# Patient Record
Sex: MALE | Race: WHITE
[De-identification: names, ages, dates, MRNs, and addresses within clinical notes are randomized per-mention and may not be internally consistent; named-entity substitution may affect disease eponyms.]

---

## 2017-05-01 ENCOUNTER — HOSPITAL ENCOUNTER (INPATIENT)
Dept: HOSPITAL 74 - FER | Age: 82
LOS: 4 days | Discharge: HOME | DRG: 312 | End: 2017-05-05
Attending: NURSE PRACTITIONER | Admitting: INTERNAL MEDICINE
Payer: COMMERCIAL

## 2017-05-01 VITALS — BODY MASS INDEX: 30.9 KG/M2

## 2017-05-01 DIAGNOSIS — Z87.891: ICD-10-CM

## 2017-05-01 DIAGNOSIS — D72.818: ICD-10-CM

## 2017-05-01 DIAGNOSIS — B34.9: ICD-10-CM

## 2017-05-01 DIAGNOSIS — I10: ICD-10-CM
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
## 2017-05-01 DIAGNOSIS — Z87.442: ICD-10-CM
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
## 2017-05-01 DIAGNOSIS — I95.1: Primary | ICD-10-CM

## 2017-05-01 DIAGNOSIS — D69.6: ICD-10-CM

## 2017-05-01 DIAGNOSIS — E86.1: ICD-10-CM

## 2017-05-01 DIAGNOSIS — M10.9: ICD-10-CM

## 2017-05-01 DIAGNOSIS — S09.90XA: ICD-10-CM

## 2017-05-01 DIAGNOSIS — D72.820: ICD-10-CM

## 2017-05-01 DIAGNOSIS — Z95.0: ICD-10-CM

## 2017-05-01 LAB
ALBUMIN SERPL-MCNC: 4.6 G/DL (ref 3.5–5)
ALP SERPL-CCNC: 63 U/L (ref 32–92)
ALT SERPL-CCNC: 28 U/L (ref 10–40)
ANION GAP SERPL CALC-SCNC: 12 MMOL/L (ref 8–16)
AST SERPL-CCNC: 36 U/L (ref 10–42)
BASOPHILS # BLD: 3.2 % (ref 0–2)
BILIRUB SERPL-MCNC: 1.1 MG/DL (ref 0.2–1)
CALCIUM SERPL-MCNC: 9.2 MG/DL (ref 8.4–10.2)
CK SERPL-CCNC: 104 IU/L (ref 38–174)
CO2 SERPL-SCNC: 25 MMOL/L (ref 22–28)
CREAT SERPL-MCNC: 1.1 MG/DL (ref 0.6–1.3)
DEPRECATED RDW RBC AUTO: 13.7 % (ref 11.9–15.9)
EOSINOPHIL # BLD: 0 % (ref 0–4.5)
GLUCOSE SERPL-MCNC: 115 MG/DL (ref 74–106)
INR BLD: 1.14 (ref 0.82–1.09)
MAGNESIUM SERPL-MCNC: 1.8 MG/DL (ref 1.8–2.4)
MCH RBC QN AUTO: 31 PG (ref 25.7–33.7)
MCHC RBC AUTO-ENTMCNC: 34.5 G/DL (ref 32–35.9)
MCV RBC: 90 FL (ref 80–96)
NEUTROPHILS # BLD: 71.4 % (ref 42.8–82.8)
PLATELET # BLD AUTO: 124 K/MM3 (ref 134–434)
PMV BLD: 9 FL (ref 7.5–11.1)
PROT SERPL-MCNC: 8.3 G/DL (ref 6.4–8.3)
PT PNL PPP: 12.7 SEC (ref 10.2–13)
TROPONIN I SERPL-MCNC: 0.03 NG/ML (ref 0.03–0.5)
WBC # BLD AUTO: 4.8 K/MM3 (ref 4–10.8)

## 2017-05-01 NOTE — PDOC
History of Present Illness





- General


Chief Complaint: Injury


Stated Complaint: DIZZY AND FELL


Time Seen by Provider: 05/01/17 21:46


History Source: Patient


Exam Limitations: No Limitations





- History of Present Illness


Initial Comments: 


05/01/17 22:23


81y M hx of HTN, s/p PM, presents with feeling lightheaded all day and this 

evening, he stood up, felt dizzy and fell, hitting his head on a table, there 

was no LOC but he felt generaly weak and was unable to get up on his own. He 

had to call his family, who called the ambulance. The pt denies any headache, n/

v, vision changes, neck pain, numbness/tingling weakness. The pt did endorse 

feeling chills/warm today, feeling dizzy/lightheaded since he awoke today, and 

endorse some frequency for the past week. Pt denies any heaadche, chest pain, 

sob, cough, abd pain, n/v, back pain, n/v, diarrhea, melena, bpr.  




















Past History





- Past Medical History


Allergies/Adverse Reactions: 


 Allergies











Allergy/AdvReac Type Severity Reaction Status Date / Time


 


No Known Allergies Allergy   Verified 05/01/17 23:17











Home Medications: 


Ambulatory Orders





Losartan Potassium [Cozaar] 100 mg PO DAILY 05/01/17 








Anemia: No


Asthma: No


Cancer: No


Cardiac Disorders: Yes (PACEMAKER 2011)


CVA: No


COPD: No


CHF: No


Dementia: No


Diabetes: No


GI Disorders: No


 Disorders: No


HTN: Yes


Hypercholesterolemia: No


Liver Disease: No


Seizures: No


Thyroid Disease: No





- Surgical History


Abdominal Surgery: No


Appendectomy: Yes (1944)


Cardiac Surgery: Yes (PACEMAKER 2011)


Cholecystectomy: Yes (1990S)


Lung Surgery: No


Neurologic Surgery: No


Orthopedic Surgery: Yes (TKR LEFT KNEE 2006/TKR RIGHT 2011)





- Psycho/Social/Smoking Cessation Hx


Anxiety: No


Suicidal Ideation: No


Smoking History: Former smoker


Have you smoked in the past 12 months: No


Number of Cigarettes Smoked Daily: 0


If you are a former smoker, when did you quit?: 1950S


'Breaking Loose' booklet given: 02/07/16


Hx Alcohol Use: Yes (OCCAS.)


Drug/Substance Use Hx: No


Substance Use Type: Alcohol


Hx Substance Use Treatment: No





**Review of Systems





- Review of Systems


Able to Perform ROS?: Yes


Comments:: 





05/01/17 22:39


Constitutional - +Chills, weakness, no reported Fever, 


HEENT: no reported vision changes, sore throat


Respiratory: no reported cough, sob, hemoptysis


Cardiac: +light headedness, no reported chest pain, palpitations,  leg swelling


Abd/GI: no reported abd pain, nausea, vomiting, blood per rectum, melena, 

diarrhea


: +frequency, no reported dysuria, discharge


Musculskelatal - no reported back pain, joint swelling


skin - no reported bruising, erythema, rash


neurological: no reported headache, numbness, focal weakness, tingling, ataxia, 

weakness


hematologic: no reported anemia, easy bruising, easy bleeding








*Physical Exam





- Physical Exam


Comments: 





05/01/17 22:40


GENERAL: The patient is awake, alert, and fully oriented, Nontoxic - in no 

acute distress.


HEAD: Normocephalic, contusion over R brow w/o focal tendenress, stepoffs.


EYES: extraocular movements intact, sclera anicteric, conjunctiva clear.


ENT: Normal voice,  Moist mucous membranes.


NECK: Normal range of motion, supple


LUNGS: Breath sounds equal, clear to auscultation bilaterally.  No wheezes, no 

rhonchi, no rales.


HEART: Regular rate and rhythm, normal S1 and S2 without murmur, rub or gallop.


ABDOMEN: Soft, nontender, normoactive bowel sounds.  No guarding, no rebound.  

. No CVA tenderness


EXTREMITIES: Normal range of motion, no edema.  No clubbing or cyanosis. No 

cords, erythema, or tenderness.


NEUROLOGICAL: No facial assymetry, Normal speech, 


PSYCH: Normal mood, normal affect.


SKIN: Warm, Dry, normal turgor,


Back: No midline tenderness to the cervical, thoracic or lumbar spine


Musculoskelatal: FROM of b/l shoulders, elbows, wrist. FROM of hips, knees, 

ankles - No signs of ecchymosis, erythema, or crepitus noted on palpation 

extremities, chest wall, clavicals, ribs, back.  





**Heart Score/ECG Review





- ECG Impressions


Comment:: 





05/02/17 01:44


Twelve-lead EKG was performed and reviewed by me. 


EKG performed:


Paced ventricular rhythm


Rate of 92


Appropriate discordance


No signs of ischemia via Sgarbossa criteria





ED Treatment Course





- LABORATORY


CBC & Chemistry Diagram: 


 05/01/17 22:50





 05/01/17 22:50





Medical Decision Making





- Medical Decision Making


05/01/17 22:41


pt noted febrile here


sepsis initiated


?uti as pt has had frequency


will r/o anemia, metabolic dernagement, occult infection


ct head to r/o bleed


ekg to screen for arrythmia


will give fluids, tylenol


will reassess











05/02/17 01:32


labs reviewed


no source of fever


ua clean


cxr does not reavdeal any signs of pna


influenza sent


will admit pt for further evaluation of fever and presyncope


will hold empiric abx as there is no source








05/02/17 01:42








05/02/17 01:43


case dw dr. lemus 


will admit to observation in tele for further management





Case discussed in detail with admitting physician including history, physical 

exam and ancillary studies.


Admitting physician has assumed care for the patient, will follow all pending 

diagnostics and will complete the evaluation and treatment. 








*DC/Admit/Observation/Transfer


Diagnosis at time of Disposition: 


 Pre-syncope





Fever


Qualifiers:


 Fever type: unspecified Qualified Code(s): R50.9 - Fever, unspecified





Head injury


Qualifiers:


 Encounter type: initial encounter Qualified Code(s): S09.90XA - Unspecified 

injury of head, initial encounter





- Discharge Dispostion


Condition at time of disposition: Guarded


Admit: Yes





- Referrals


Referrals: 


Carlo Hui MD [Primary Care Provider] -

## 2017-05-02 LAB
ALBUMIN SERPL-MCNC: 3.9 G/DL (ref 3.5–5)
ALP SERPL-CCNC: 55 U/L (ref 32–92)
ALT SERPL-CCNC: 32 U/L (ref 10–40)
ANION GAP SERPL CALC-SCNC: 10 MMOL/L (ref 8–16)
APPEARANCE UR: CLEAR
AST SERPL-CCNC: 44 U/L (ref 10–42)
BASOPHILS # BLD: 0.7 % (ref 0–2)
BILIRUB SERPL-MCNC: 1.4 MG/DL (ref 0.2–1)
BILIRUB UR STRIP.AUTO-MCNC: NEGATIVE MG/DL
CALCIUM SERPL-MCNC: 8.5 MG/DL (ref 8.4–10.2)
CK MB: 2.7 NG/ML (ref 0.3–4)
CK SERPL-CCNC: 234 IU/L (ref 38–174)
CO2 SERPL-SCNC: 24 MMOL/L (ref 22–28)
COLOR UR: YELLOW
CREAT SERPL-MCNC: 1.2 MG/DL (ref 0.6–1.3)
DEPRECATED RDW RBC AUTO: 13.6 % (ref 11.9–15.9)
EOSINOPHIL # BLD: 0.1 % (ref 0–4.5)
GLUCOSE SERPL-MCNC: 126 MG/DL (ref 74–106)
HYALINE CASTS URNS QL MICRO: 1 /LPF
KETONES UR QL STRIP: NEGATIVE
LEUKOCYTE ESTERASE UR QL STRIP.AUTO: NEGATIVE
MAGNESIUM SERPL-MCNC: 1.9 MG/DL (ref 1.8–2.4)
MCH RBC QN AUTO: 31.4 PG (ref 25.7–33.7)
MCHC RBC AUTO-ENTMCNC: 34.4 G/DL (ref 32–35.9)
MCV RBC: 91.2 FL (ref 80–96)
MUCOUS THREADS URNS QL MICRO: (no result)
NEUTROPHILS # BLD: 76.5 % (ref 42.8–82.8)
NITRITE UR QL STRIP: NEGATIVE
PH UR: 6 [PH] (ref 5–8)
PHOSPHATE SERPL-MCNC: 4.4 MG/DL (ref 2.5–4.6)
PLATELET # BLD AUTO: 112 K/MM3 (ref 134–434)
PMV BLD: 9.2 FL (ref 7.5–11.1)
PROT SERPL-MCNC: 6.9 G/DL (ref 6.4–8.3)
PROT UR QL STRIP: (no result)
PROT UR QL STRIP: NEGATIVE
RBC # BLD AUTO: 3 /HPF (ref 0–3)
RBC # UR STRIP: (no result) /UL
SP GR UR: 1.01 (ref 1–1.03)
TROPONIN I SERPL-MCNC: < 0.03 NG/ML (ref 0.03–0.5)
UROBILINOGEN UR STRIP-MCNC: (no result) E.U./DL (ref 0.2–1)
WBC # BLD AUTO: 5 K/MM3 (ref 4–10.8)
WBC # UR AUTO: 1 /HPF (ref 3–5)

## 2017-05-02 RX ADMIN — HEPARIN SODIUM SCH UNIT: 5000 INJECTION, SOLUTION INTRAVENOUS; SUBCUTANEOUS at 21:17

## 2017-05-02 RX ADMIN — ACETAMINOPHEN PRN MG: 325 TABLET ORAL at 12:30

## 2017-05-02 RX ADMIN — CEFTRIAXONE SODIUM SCH MLS/HR: 2 INJECTION, POWDER, FOR SOLUTION INTRAMUSCULAR; INTRAVENOUS at 12:30

## 2017-05-02 RX ADMIN — ACETAMINOPHEN PRN MG: 325 TABLET ORAL at 23:12

## 2017-05-02 RX ADMIN — LOSARTAN POTASSIUM SCH MG: 50 TABLET, FILM COATED ORAL at 10:00

## 2017-05-02 RX ADMIN — RANITIDINE SCH MG: 150 TABLET ORAL at 13:27

## 2017-05-02 NOTE — HP
CHIEF COMPLAINT:  pre-syncope/fever 





PCP:  Dr Hui


Cardiologist: Dr Wheeler 





HISTORY OF PRESENT ILLNESS:  patient is a 82 y/o male with a past medical 

history of hypertension, PPM, renal calculi (left urethral stent placement, 

removed 3/16), and gout.  patient's reports that yesterday, 2017, he 

stood up from a  sitting position and became dizzy and fell forward striking 

his head on a table. Patient denied any loss of consciousness and is able to 

recall a full incident in detail. in addition patient reports feeling 

increasinglytired with chills earlier in the day.  He does report on 2017

,  general malaise and chills.  as a result, patient contacted EMS and was 

transferred to the emergency department for further evaluation. 





ER course was notable for:


(1) ct of head no acute pathology


(2)tmax of 101.0 upon arrival to the ED 


(3)chest xray,  no infilitrates no effusion noted 





Recent Travel:  none





PAST MEDICAL HISTORY:  htn, renal calculi, gout


PAST SURGICAL HISTORY: ppm, urethral stent placement 





Social History:  retired lives at home, with wife 


Smoking: none 


Alcohol:none 


Drugs: none 





Family History:  father,  of MI at 80 mother,  of CHF 65 sister  of 

brain tumor at 71


Allergies





No Known Allergies Allergy (Verified 17 23:17)


 








HOME MEDICATIONS:


 Home Medications











 Medication  Instructions  Recorded


 


Losartan Potassium [Cozaar] 100 mg PO DAILY 17








REVIEW OF SYSTEMS


CONSTITUTIONAL: 


Present: fever, chills, diaphoresis, generalized weakness, malaise,


Absent:   loss of appetite, weight change


HEENT: 


Absent:  rhinorrhea, nasal congestion, throat pain, throat swelling, difficulty 

swallowing, mouth swelling, ear pain, eye pain, visual changes


CARDIOVASCULAR: 


Absent: chest pain, syncope, palpitations, irregular heart rate, lightheadedness

, peripheral edema


RESPIRATORY: 


Absent: cough, shortness of breath, dyspnea with exertion, orthopnea, wheezing, 

stridor, hemoptysis


GASTROINTESTINAL:


Absent: abdominal pain, abdominal distension, nausea, vomiting, diarrhea, 

constipation, melena, hematochezia


GENITOURINARY: 


Absent: dysuria, frequency, urgency, hesitancy, hematuria, flank pain, genital 

pain


MUSCULOSKELETAL: 


Absent: myalgia, arthralgia, joint swelling, back pain, neck pain


SKIN: 


Absent: rash, itching, pallor


HEMATOLOGIC/IMMUNOLOGIC: 


Absent: easy bleeding, easy bruising, lymphadenopathy, frequent infections


ENDOCRINE:


Absent: unexplained weight gain, unexplained weight loss, heat intolerance, 

cold intolerance


NEUROLOGIC: 


Absent: headache, focal weakness or paresthesias, dizziness, unsteady gait, 

seizure, mental status changes, bladder or bowel incontinence


PSYCHIATRIC: 


Absent: anxiety, depression, suicidal or homicidal ideation, hallucinations.








PHYSICAL EXAMINATION


 Vital Signs - 24 hr











  17





  02:16 06:09 07:32


 


Temperature 98.3 F 97.9 F 


 


Pulse Rate 70 68 


 


Respiratory 





Rate   


 


Blood Pressure 109/56 108/59 


 


O2 Sat by Pulse   96





Oximetry (%)   











GENERAL: diaphoretic, Awake, alert, and fully oriented, in no acute distress.


HEAD: Normal with no signs of trauma.


EYES: Pupils equal, round and reactive to light, extraocular movements intact, 

sclera anicteric, conjunctiva clear. No lid lag.


EARS, NOSE, THROAT: Ears normal, nares patent, oropharynx clear without 

exudates. Moist mucous membranes.


NECK: Normal range of motion, supple without lymphadenopathy, JVD, or masses.


LUNGS: Breath sounds equal, clear to auscultation bilaterally. No wheezes, and 

no crackles. No accessory muscle use.


HEART: Regular rate and rhythm, normal S1 and S2, 2/6 sysolic murmur, rub or 

gallop.


ABDOMEN: Soft, nontender, not distended, normoactive bowel sounds, no guarding, 

no rebound, no masses.  No hepatomegaly or  splenomegaly. 


MUSCULOSKELETAL: Normal range of motion at all joints. No bony deformities or 

tenderness. No CVA tenderness.


UPPER EXTREMITIES: 2+ pulses, warm, well-perfused. No cyanosis. No clubbing. No 

peripheral edema.


LOWER EXTREMITIES: 2+ pulses, warm, well-perfused. No calf tenderness. No 

peripheral edema. 


NEUROLOGICAL:  Cranial nerves II-XII intact. Normal speech. Normal gait.


PSYCHIATRIC: Cooperative. Good eye contact. Appropriate mood and affect.


SKIN: Warm, dry, normal turgor, no rashes or lesions noted, normal capillary 

refill. 





ASSESSMENT/PLAN:





1) card: 


presyncope:


- likely secondary from to static hypotension due to hypovolemia versus 

dysrhythmias high clinical concern for atrial fibrillation


- Continuous cardiac monitoring 


- EKG reviewed, a ventricular paced with PVCs


- troponin 1 WNL pending second and third troponin.


- medtronic contacted for interrogation of pace maker


- nuclear stress tests 2016, LVEF 44%, no chest discomfort or EKG changes

, small size mild to moderate apical infarct, remainder of LV normal myocardial 

perfusion as per cardiologist Dr. Rajput


- Echocardiogram, 2016 EF 68% mild aortic stenosis mild LVH


- appreciate cardiology input Dr. Wheeler patient's primary cardiologist


hypertension


- Continue losartan home dose, close monitoring of BP





2) ID


Fever of unknown origin


- high clinical concern for sepsis, patient has a past medical history of 

bacteremia 2016 from a  source


- CT of abdomen/pelvis ordered assess for hydronephrosis


- Pending blood cultures and urine cultures


- t max 101.0 no leukocytosis noted


- Patient input from ID (Lenin) 





3) ms: 


gout


- no acute exacerbation at this time





F/E/N


- Gentle IVF


- Regular diet





PPX


Zantac


Heparin


PT


OOB


SCD





dispo: requires inpatient telemetry 





Visit type





- Emergency Visit


Emergency Visit: Yes


ED Registration Date: 17


Care time: The patient presented to the Emergency Department on the above date 

and was hospitalized for further evaluation of their emergent condition.





- New Patient


This patient is new to me today: Yes


Date on this admission: 17





- Critical Care


Critical Care patient: Yes


Total Critical Care Time (in minutes): 45


Critical Care Statement: The care of this patient involved high complexity 

decision making to prevent further life threatening deterioration of the patient

's condition and/or to evalute & treat vital organ system(s) failure or risk of 

failure.

## 2017-05-02 NOTE — PN
Progress Note (short form)





- Note


Progress Note: 


ID Consult dictated


80 y/o male admitted with dizziness, fall


+ high grade fever, chills





Suspect sepsis, possible  source


Possible viral syndrome


Thrombocytopenia, possibly secondary to sepsis





Await c/s 


Empiric ceftriaxone + stat dose vancomycin

## 2017-05-02 NOTE — EKG
Test Reason : 

Blood Pressure : ***/*** mmHG

Vent. Rate : 092 BPM     Atrial Rate : 048 BPM

   P-R Int : 000 ms          QRS Dur : 168 ms

    QT Int : 476 ms       P-R-T Axes : 000 -76 076 degrees

   QTc Int : 588 ms

 

Ventricular-paced rhythm WITH FREQUENT PREMATURE VENTRICULAR COMPLEXES

ABNORMAL ECG

NO PREVIOUS ECGS AVAILABLE

Confirmed by EVAN SIGALA MD ([id number]) on 5/2/2017 11:01:16 PM

 

Referred By: MD ALLEN           Confirmed By:EVAN SIGALA MD

## 2017-05-02 NOTE — CONS
DATE OF CONSULTATION:  2017

 

REQUESTING PHYSICIAN:  Daniel Veloz M.D. 

 

HISTORY OF PRESENT ILLNESS:  The patient is an 81-year-old man admitted on May 1,

2017, because of lightheadedness, falling, and head trauma.  

 

The patient has no history of a previous myocardial infarction, angina, or congestive

heart failure; however, in 2009, he had implantation of a Medtronic DDD

pacemaker for complete heart block.  The most recent noninvasive studies include a

2016 echocardiogram which demonstrated mild aortic stenosis.  The aortic

valve area was 1.8 sq cm, and a peak gradient was 6 mmHg.  There was mild mitral

insufficiency, and the pulmonary artery systolic pressure was 30 to 35 mmHg.  The

left ventricular ejection fraction was 68%.  A Lexiscan sestamibi study demonstrated

a left ventricular ejection fraction of 44% without any evidence of myocardial

ischemia.  He is admitted now after complaining of a shaking chills the day prior to

admission, and then on the day of admission he felt lightheaded, he got up, felt

dizzy, he did pass out, but fell down and hit his head, was unable to get up.  He was

brought to the emergency room where he was found to have a fever to 103 and admitted

for further evaluation and treatment.  He presently denies any symptoms of chest

pain, palpitations, shortness of breath, nausea, or vomiting.  

 

There is a previous history of hypertension.  

 

MEDICATION:  Prior to admission include allopurinol 300 mg per day, amlodipine 10 mg

per day, aspirin 81 mg per day, and losartan 100 mg per day.  

 

PRIOR MEDICAL HISTORY:  Hypertension, osteoarthritis, gout, complete heart block

requiring a permanent pacemaker 2009, renal stone, hyperlipidemia, intolerant

of medical intervention.

 

PRIOR SURGICAL HISTORY:  Total knee replacement, cardiac pacemaker, cholecystectomy,

appendectomy.  

 

FAMILY HISTORY:  His mother  of congestive heart failure at age 65, his father

 of a myocardial infarction at age 80.  A sister  of a brain tumor.

 

SOCIAL HISTORY:  He lives at home, manages his own affairs.  There is no history of

alcohol abuse.  He smoked in the distant past, stopping in 1959.  He is a business

owner.  

 

REVIEW OF SYSTEMS:  

General:  No weight loss.  

Gastrointestinal:  No melena, no vomiting.  

Pulmonary:  No hemoptysis.  

Cardiac:  No edema.  

Neurological:  No focal deficit.  

 

PHYSICAL EXAMINATION:

General:  The patient appears in no acute distress, lying flat.  

Vital signs:  The temperature on admission was 103.7, presently afebrile.  The blood

pressure is 110/60, the heart rate is 90, the respiratory rate is 18 per minute, the

weight is 186 pounds.  

Neck:  No neck vein distention.  The permanent pacemaker is palpable in the right

subclavicular area.  

Lungs:  Lung fields are clear.

Heart:  The heart sounds are normal.  There is a 1-2 over 6 systolic ejection murmur

at the base best heard sitting upright with breath holding.  The murmur is early

peaking.  No diastolic sounds are appreciated.  

Abdomen:  Soft and nontender.  No organ enlargement.  No peripheral edema.  

Neurologic:  There is no focal neurological deficit.

 

DATABASE:    The electrocardiogram demonstrates a paced rhythm.  The pacemaker is

sensing and capturing appropriately in a VVIR mode.  Premature ventricular

depolarizations are noted.  Compared to a previous tracing, dated 2016,

DDDR pacing is not apparent, i.e. P waves are not apparent on the present tracing.

 

The chest x-ray was a portable film, technically limited.  The heart size cannot be

accurately assessed.  There is no distinct infiltrate.  

 

LABORATORY:  Studies of note include a white blood cell count of 5.0, hematocrit 49%,

platelet count of 112,000.  The INR is 1.1.  The serum sodium is 132, BUN 15,

creatinine 1.1.  The troponin level is 0.03.  The urinalysis is negative for white

blood cells, +1 protein, negative glucose.  

 

IMPRESSION:  The working diagnosis is orthostatic hypertension exacerbated by

intravascular volume depletion related to vasodilatation secondary to

fever/infection.  There is no evidence to suggest acute ischemic event/infarction. 

Pacemaker function appears to be normal.  However, concerning this for the underlying

rhythm as P waves are not apparent on the present tracing, raising concern for

underlying atrial fibrillation which would be a new rhythm disturbance for the

patient.

 

RECOMMENDATION:  I recommended the followin.  Antibiotics dictated by cultures.

2.  Interrogation of the Medtr[employer] pacemaker with particular concern regarding atrial

activity, sensing, and ? atrial fibrillation.  

3.  Hold antihypertensive agents at this time.

4.  Further cardiac testing is not required at this time.  Further workup and

treatment will be dictated by the results of the above evaluation and the patient's

clinical course.  

 

Thank you for allowing me to take part in the care of this presentation.  Will follow

along with you.  

 

 

BUCK LOVETT M.D.

 

[id number]

DD: 2017 10:11

DT: 2017 20:05

Job #:  [id number]

 

cc:  Carlo Hui M.D. 

cc:  Daniel Veloz M.D. 

cc:  Silvana Bailey N.P.

## 2017-05-02 NOTE — CONS
DATE OF CONSULTATION:  

 

DATE OF DICTATION: 05/02/2017

 

HISTORY OF PRESENT ILLNESS:  An 81-year-old male evaluated for possible sepsis.  The

patient was admitted to the hospital on May 1, 2017.  He felt dizzy at home and had

fallen, sustaining trauma to his right face.  He was seen in the emergency room where

CAT scan of the head was done and was negative for acute infarct or bleed.  He was

seen in consultation by Cardiology.  His hospital course was notable for a

temperature to 103.7.  Patient complained of chills and also complained of some

recent urinary frequency.  He denied any dysuria or hematuria.   No complaints of

chest pain, shortness of breath, cough, or sputum production.  No vomiting or

diarrhea.  A rapid flu test was performed and was negative.  

 

PAST MEDICAL HISTORY: Positive for nephrolithiasis, hypertension, osteoarthritis,

gouty arthritis, hyperlipidemia. 

 

PAST SURGICAL HISTORY:  Status post permanent pacemaker, bilateral total knee

replacements, cholecystectomy, appendectomy. 

 

ALLERGIES:  No known allergies. 

 

MEDICATIONS:  Cozaar, Norvasc, allopurinol, aspirin.  

 

SOCIAL HISTORY:  He is a former smoker.  He works in a [employer] business. 

He denies any recent hospitalizations.  No known ill contacts.  No recent travel.  

 

SYSTEMS REVIEW:

Neurologic:  No loss of consciousness, seizure activity, focal weakness, cardiac,

negative chest pain or palpitations.  

Respiratory:  Negative cough or sputum production.  

Gastrointestinal:   Negative vomiting or diarrhea. 

Genitourinary:   As per HPI. 

 

LABORATORY DATA:  White count 5.0, hematocrit 48.9, platelet count 112, BUN 22,

creatinine 1.2.  Urinalysis:  1 white cell.  Influenza swab negative.  Urine culture

and blood culture is pending.  Chest x-ray negative for acute infiltrate.  

 

PHYSICAL EXAMINATION:

General:  He is awake and alert.  He is in no acute distress.  He is noted to have

some chills.  Temperature 97.9, T-max 103.7, blood pressure 108/59, pulse 68 and

regular, respirations 20 per minute. 

HEENT:  Sclerae anicteric.  

Heart: Sounds S1, S2.  

Lungs:  Scattered rhonchi bilaterally.  No rales or wheezing.  

Abdomen:  Obese, soft, nontender.  

Extremities:  1+ edema.  There is an abrasion present on the right maxillary area

secondary to his fall.  

 

IMPRESSION:   An 81-year-old male admitted with dizziness and fall, now with high

grade fever or chills. 

 

Suspect sepsis possibly secondary to genitourinary source, cannot rule out viral

syndrome.  Await blood and urine culture results, empiric antibiotic coverage with

ceftriaxone IV piggyback daily and stat dose of vancomycin 1 g.  Await culture

results, will follow.  

 

Thank you for the kind referral. 

 

 

EMILY JIMÉNEZ M.D.

 

[id number]

DD: 05/02/2017 11:51

DT: 05/02/2017 19:44

Job #:  [id number]

## 2017-05-02 NOTE — CONSULT
Consult - text type





- Consultation


Consultation Note: 


ASKED BY DR. ELLIOTT TO SEE PT.


PT SEEN, EXAMINED. ECG, PRIOR RECORDS REVIEWED.


82 YO MAN 5/01/17 DIZZINESS/FALLING.


WORKING DX:


ORTHOSTATIC HYPOTENSION EXACERBATED BY INTRAVASCULAR VOLUME DEPLETION,


  VASODILATATION DUE TO FEVER/INFECTION.


NO EVIDENCE OF ACUTE MYOCARDIAL ISCHEMIA/INFARCTION.


NORMAL PACEMAKER FUNCTION





ECG VVI PACED RHYTHM.


      ?UNDERLING ATRIAL FIBRILLATION.





REC:


ANTIBIOTICS DICTATED BY CULTURES.


INTERROGATE PACEMAKER WITH PARTICULAR CONCERN REGARDING ATRIAL SENSING/ATRIAL 

FIBRILLATION.


HOLD ANTI  HYPERTENSIVE AGENTS.


NO FURTHER CARDIAC TESTING AT THIS TIME.





THANKS.


FULL NOTE DICTATED.


WILL FOLLOW.

## 2017-05-03 LAB
ALBUMIN SERPL-MCNC: 3.5 G/DL (ref 3.5–5)
ALP SERPL-CCNC: 48 U/L (ref 32–92)
ALT SERPL-CCNC: 30 U/L (ref 10–40)
ANION GAP SERPL CALC-SCNC: 9 MMOL/L (ref 8–16)
AST SERPL-CCNC: 50 U/L (ref 10–42)
BASOPHILS # BLD: 1.1 % (ref 0–2)
BILIRUB SERPL-MCNC: 1.1 MG/DL (ref 0.2–1)
CALCIUM SERPL-MCNC: 8.6 MG/DL (ref 8.4–10.2)
CO2 SERPL-SCNC: 20 MMOL/L (ref 22–28)
CREAT SERPL-MCNC: 1 MG/DL (ref 0.6–1.3)
DEPRECATED RDW RBC AUTO: 13.5 % (ref 11.9–15.9)
EOSINOPHIL # BLD: 0.1 % (ref 0–4.5)
GLUCOSE SERPL-MCNC: 115 MG/DL (ref 74–106)
MAGNESIUM SERPL-MCNC: 2 MG/DL (ref 1.8–2.4)
MCH RBC QN AUTO: 30.4 PG (ref 25.7–33.7)
MCHC RBC AUTO-ENTMCNC: 33.5 G/DL (ref 32–35.9)
MCV RBC: 90.7 FL (ref 80–96)
NEUTROPHILS # BLD: 55.4 % (ref 42.8–82.8)
PHOSPHATE SERPL-MCNC: 2.8 MG/DL (ref 2.5–4.6)
PLATELET # BLD AUTO: 89 K/MM3 (ref 134–434)
PMV BLD: 10.1 FL (ref 7.5–11.1)
PROT SERPL-MCNC: 6.7 G/DL (ref 6.4–8.3)
WBC # BLD AUTO: 3.3 K/MM3 (ref 4–10.8)

## 2017-05-03 RX ADMIN — LOSARTAN POTASSIUM SCH MG: 50 TABLET, FILM COATED ORAL at 09:15

## 2017-05-03 RX ADMIN — PIPERACILLIN SODIUM,TAZOBACTAM SODIUM SCH: 3; .375 INJECTION, POWDER, FOR SOLUTION INTRAVENOUS at 19:41

## 2017-05-03 RX ADMIN — RANITIDINE SCH MG: 150 TABLET ORAL at 09:15

## 2017-05-03 RX ADMIN — CEFTRIAXONE SODIUM SCH MLS/HR: 2 INJECTION, POWDER, FOR SOLUTION INTRAMUSCULAR; INTRAVENOUS at 09:15

## 2017-05-03 RX ADMIN — HEPARIN SODIUM SCH UNIT: 5000 INJECTION, SOLUTION INTRAVENOUS; SUBCUTANEOUS at 09:16

## 2017-05-03 RX ADMIN — HEPARIN SODIUM SCH UNIT: 5000 INJECTION, SOLUTION INTRAVENOUS; SUBCUTANEOUS at 22:36

## 2017-05-03 NOTE — PN
Physical Exam: 


SUBJECTIVE: Patient seen and examined, patient reports feeling slightly improved

, reports ongoing cough, spiked fever tmax 103.4, last evening. 





OBJECTIVE:patient is a 82 y/o male with a past medical history of hypertension, 

PPM, renal calculi (left urethral stent placement, removed 3/16), and gout  he 

was admitted from the emergency department for presyncopal episode and sepsis





 Vital Signs











 Period  Temp  Pulse  Resp  BP Sys/Rivera  Pulse Ox


 


 Last 24 Hr  97.9 F-103.4 F  49-96  16-19  100-133/54-68  91-97














GENERAL: diaphoretic, Awake, alert, and fully oriented, in no acute distress.


HEAD: Normal with no signs of trauma.


EYES: Pupils equal, round and reactive to light, extraocular movements intact, 

sclera anicteric, conjunctiva clear. No lid lag.


EARS, NOSE, THROAT: Ears normal, nares patent, oropharynx clear without 

exudates. Moist mucous membranes.


NECK: Normal range of motion, supple without lymphadenopathy, JVD, or masses.


LUNGS: Breath sounds equal, clear to auscultation bilaterally. No wheezes, and 

no crackles. No accessory muscle use.


HEART: Regular rate and rhythm, normal S1 and S2, 2/6 sysolic murmur, rub or 

gallop.


ABDOMEN: Soft, nontender, not distended, normoactive bowel sounds, no guarding, 

no rebound, no masses.  No hepatomegaly or  splenomegaly. 


MUSCULOSKELETAL: Normal range of motion at all joints. No bony deformities or 

tenderness. No CVA tenderness.


UPPER EXTREMITIES: 2+ pulses, warm, well-perfused. No cyanosis. No clubbing. No 

peripheral edema.


LOWER EXTREMITIES: 2+ pulses, warm, well-perfused. No calf tenderness. No 

peripheral edema. 


NEUROLOGICAL:  Cranial nerves II-XII intact. Normal speech. Normal gait.


PSYCHIATRIC: Cooperative. Good eye contact. Appropriate mood and affect.


SKIN: Warm, dry, normal turgor, no rashes or lesions noted, normal capillary 

refill. 





 Laboratory Results - last 24 hr











  05/03/17 05/03/17





  10:12 10:12


 


WBC  3.3 L D 


 


RBC  4.89 


 


Hgb  14.8  D 


 


Hct  44.3 


 


MCV  90.7 


 


MCHC  33.5 


 


RDW  13.5 


 


Plt Count  89 L D 


 


MPV  10.1 


 


Neutrophils %  55.4  D 


 


Lymphocytes %  34.5  D 


 


Monocytes %  8.9 


 


Eosinophils %  0.1 


 


Basophils %  1.1 


 


Sodium   131 L


 


Potassium   3.9


 


Chloride   102


 


Carbon Dioxide   20 L


 


Anion Gap   9


 


BUN   19 H


 


Creatinine   1.0


 


Creat Clearance w eGFR   > 60


 


Random Glucose   115 H


 


Calcium   8.6


 


Phosphorus   2.8  D


 


Magnesium   2.0


 


Total Bilirubin   1.1 H D


 


AST   50 H


 


ALT   30


 


Alkaline Phosphatase   48


 


Total Protein   6.7


 


Albumin   3.5








Active Medications











Generic Name Dose Route Start Last Admin





  Trade Name Freq  PRN Reason Stop Dose Admin


 


Acetaminophen  650 mg 05/02/17 11:33 05/02/17 23:12





  Tylenol -  PO   650 mg





  Q4H PRN   Administration





  FEVER OR PAIN   


 


Heparin Sodium (Porcine)  5,000 unit 05/02/17 22:00 05/03/17 09:16





  Heparin -  SQ   5,000 unit





  BID KATERINE   Administration


 


Piperacillin Sod/Tazobactam Sod  50 mls @ 100 mls/hr 05/03/17 10:15  





  Zosyn 3.375gm Ivpb (Pre-Docked)  IVPB   





  Q8H-IV KATERINE   





  Protocol   


 


Losartan Potassium  100 mg 05/02/17 10:00 05/03/17 09:15





  Cozaar -  PO   100 mg





  DAILY [facility]   Administration


 


Ranitidine HCl  150 mg 05/02/17 12:00 05/03/17 09:15





  Zantac -  PO   150 mg





  DAILY KATERINE   Administration








 Microbiology





05/01/17 23:10   Urine - Urine Clean Catch   Urine Culture - Final


                            NO GROWTH OBTAINED


05/02/17 01:10   Nasopharyngeal Swab   Influenza Types A,B Antigen (DYANA) - Final


05/02/17 01:10   Nasopharyngeal Swab    - Final, negative





IMAGING


nuclear stress tests 11/08/2016, LVEF 44%, no chest discomfort or EKG changes, 

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 

perfusion as per cardiologist Dr. Rajput


Echocardiogram, 11/01/2016 EF 68% mild aortic stenosis mild LVH


CT of abdomen and pelvis:no hydronephrosis noted


CT of chest: no acute pathology 


EKG a ventricular paced with PVCs





ASSESSMENT/PLAN:





1) card: 


presyncope:


- likely secondary from to static hypotension due to hypovolemia 


- Continuous cardiac monitoring 


- troponin 3 WNL


- pacemaker interrogated, no atrial fibrillation noted


- Cardiology consulted and following 


hypertension


- Continue losartan home dose, close monitoring of BP





2) ID


sepsis


- unknown origin, pending blood culture


- t max 103.3 no leukocytosis noted


- abx changed to zosyn 


- ID consulted and following  (Lenin) 





3) ms: 


gout


- no acute exacerbation at this time





F/E/N


- Regular diet


- replete lytes prn 





PPX


Zantac


Heparin


PT


OOB


SCD





dispo: requires inpatient telemetry 








Visit type





- Emergency Visit


Emergency Visit: Yes


ED Registration Date: 05/02/17


Care time: The patient presented to the Emergency Department on the above date 

and was hospitalized for further evaluation of their emergent condition.





- New Patient


This patient is new to me today: No





- Critical Care


Critical Care patient: No





- Discharge Referral


Referred to [facility] Yes


Physician Referral: Carlo Hui MD (Int Med)

## 2017-05-03 NOTE — PN
Progress Note, Physician


History of Present Illness: 


Persistant fever noted- Tmax 103.4


No focal complaint


Denies chest pain/ dyspnea/ cough


No c/o dysuria/ hematuria


No c/o rigors


WBC WNL


Thrombocytopenia


Cultures





- Current Medication List


Current Medications: 


Active Medications





Acetaminophen (Tylenol -)  650 mg PO Q4H PRN


   PRN Reason: FEVER OR PAIN


   Last Admin: 05/02/17 23:12 Dose:  650 mg


Heparin Sodium (Porcine) (Heparin -)  5,000 unit SQ BID [facility]


   Last Admin: 05/03/17 09:16 Dose:  5,000 unit


Sodium Chloride (Normal Saline -)  1,000 mls @ 75 mls/hr IV ASDIR [facility]


   Last Admin: 05/02/17 13:28 Dose:  75 mls/hr


Ceftriaxone Sodium (Rocephin 2gm Ivpb (Pre-Docked))  100 mls @ 200 mls/hr IVPB 

DAILY [facility]


   Last Admin: 05/03/17 09:15 Dose:  200 mls/hr


Losartan Potassium (Cozaar -)  100 mg PO DAILY [facility]


   Last Admin: 05/03/17 09:15 Dose:  100 mg


Ranitidine HCl (Zantac -)  150 mg PO DAILY [facility]


   Last Admin: 05/03/17 09:15 Dose:  150 mg











- Objective


Vital Signs: 


 Vital Signs











Temperature  99.0 F   05/03/17 04:00


 


Pulse Rate  96 H  05/03/17 04:00


 


Respiratory Rate  18   05/03/17 07:24


 


Blood Pressure  120/64   05/03/17 04:00


 


O2 Sat by Pulse Oximetry (%)  91 L  05/03/17 07:24











Constitutional: Yes: No Distress


Eyes: Yes: Conjunctiva Clear


Cardiovascular: Yes: Regular Rate and Rhythm, S1, S2


Respiratory: Yes: CTA Bilaterally


Gastrointestinal: Yes: Normal Bowel Sounds, Soft.  No: Tenderness


Genitourinary: No: CVA Tenderness - Left, CVA Tenderness - Right


Labs: 


 CBC, BMP





 05/02/17 08:20 





 05/02/17 09:11 





 INR, PTT











INR  1.14  (0.82-1.09)   05/01/17  22:50    














Assessment/Plan


Fever   likely  v. pulmonary source


Possible viral syndrome


S/P fall





Cultures pending


Obtain CT chest R/O infiltrate


Broaden antimicrobial coverage

## 2017-05-04 LAB
ALBUMIN SERPL-MCNC: 3.4 G/DL (ref 3.5–5)
ALP SERPL-CCNC: 44 U/L (ref 32–92)
ALT SERPL-CCNC: 26 U/L (ref 10–40)
ANION GAP SERPL CALC-SCNC: 8 MMOL/L (ref 8–16)
AST SERPL-CCNC: 38 U/L (ref 10–42)
BILIRUB SERPL-MCNC: 0.6 MG/DL (ref 0.2–1)
CALCIUM SERPL-MCNC: 8.5 MG/DL (ref 8.4–10.2)
CO2 SERPL-SCNC: 23 MMOL/L (ref 22–28)
CREAT SERPL-MCNC: 1 MG/DL (ref 0.6–1.3)
DEPRECATED RDW RBC AUTO: 13.6 % (ref 11.9–15.9)
EOSINOPHIL # BLD: 1 % (ref 0–4.5)
GLUCOSE SERPL-MCNC: 103 MG/DL (ref 74–106)
MAGNESIUM SERPL-MCNC: 2 MG/DL (ref 1.8–2.4)
MCH RBC QN AUTO: 30.5 PG (ref 25.7–33.7)
MCHC RBC AUTO-ENTMCNC: 33.6 G/DL (ref 32–35.9)
MCV RBC: 90.9 FL (ref 80–96)
NEUTROPHILS # BLD: 41 % (ref 42.8–82.8)
PLATELET # BLD AUTO: 90 K/MM3 (ref 134–434)
PMV BLD: 9.8 FL (ref 7.5–11.1)
PROT SERPL-MCNC: 6.2 G/DL (ref 6.4–8.3)
WBC # BLD AUTO: 2.8 K/MM3 (ref 4–10.8)

## 2017-05-04 RX ADMIN — PIPERACILLIN SODIUM,TAZOBACTAM SODIUM SCH MLS/HR: 3; .375 INJECTION, POWDER, FOR SOLUTION INTRAVENOUS at 09:02

## 2017-05-04 RX ADMIN — VANCOMYCIN HYDROCHLORIDE SCH: 1 INJECTION, POWDER, LYOPHILIZED, FOR SOLUTION INTRAVENOUS at 19:20

## 2017-05-04 RX ADMIN — PIPERACILLIN SODIUM,TAZOBACTAM SODIUM SCH: 3; .375 INJECTION, POWDER, FOR SOLUTION INTRAVENOUS at 19:20

## 2017-05-04 RX ADMIN — HEPARIN SODIUM SCH UNIT: 5000 INJECTION, SOLUTION INTRAVENOUS; SUBCUTANEOUS at 09:03

## 2017-05-04 RX ADMIN — VANCOMYCIN HYDROCHLORIDE SCH MLS/HR: 1 INJECTION, POWDER, LYOPHILIZED, FOR SOLUTION INTRAVENOUS at 21:39

## 2017-05-04 RX ADMIN — LOSARTAN POTASSIUM SCH MG: 50 TABLET, FILM COATED ORAL at 09:02

## 2017-05-04 RX ADMIN — HEPARIN SODIUM SCH UNIT: 5000 INJECTION, SOLUTION INTRAVENOUS; SUBCUTANEOUS at 21:39

## 2017-05-04 RX ADMIN — RANITIDINE SCH MG: 150 TABLET ORAL at 09:02

## 2017-05-04 RX ADMIN — PIPERACILLIN SODIUM,TAZOBACTAM SODIUM SCH MLS/HR: 3; .375 INJECTION, POWDER, FOR SOLUTION INTRAVENOUS at 01:22

## 2017-05-04 NOTE — PN
[id number]. 





OBJECTIVE: patient is a 82 y/o male with a past medical history of hypertension

, PPM, renal calculi (left urethral stent placement, removed 3/16), and gout  

he was admitted from the emergency department for presyncopal episode and sepsis








 Vital Signs











 Period  Temp  Pulse  Resp  BP Sys/Rivera  Pulse Ox


 


 Last 24 Hr  98.2 F-98.6 F  66-79  18-19  119-130/46-73  90-98








physical examination


GENERAL: diaphoretic, Awake, alert, and fully oriented, in no acute distress.


HEAD: Normal with no signs of trauma.


EYES: Pupils equal, round and reactive to light, extraocular movements intact, 

sclera anicteric, conjunctiva clear. No lid lag.


EARS, NOSE, THROAT: Ears normal, nares patent, oropharynx clear without 

exudates. Moist mucous membranes.


NECK: Normal range of motion, supple without lymphadenopathy, JVD, or masses.


LUNGS: Breath sounds equal, clear to auscultation bilaterally. No wheezes, and 

no crackles. No accessory muscle use.


HEART: Regular rate and rhythm, normal S1 and S2, 2/6 sysolic murmur, rub or 

gallop.


ABDOMEN: Soft, nontender, not distended, normoactive bowel sounds, no guarding, 

no rebound, no masses.  No hepatomegaly or  splenomegaly. 


MUSCULOSKELETAL: Normal range of motion at all joints. No bony deformities or 

tenderness. No CVA tenderness.


UPPER EXTREMITIES: 2+ pulses, warm, well-perfused. No cyanosis. No clubbing. No 

peripheral edema.


LOWER EXTREMITIES: 2+ pulses, warm, well-perfused. No calf tenderness. No 

peripheral edema. 


NEUROLOGICAL:  Cranial nerves II-XII intact. Normal speech. Normal gait.


PSYCHIATRIC: Cooperative. Good eye contact. Appropriate mood and affect.


SKIN: Warm, dry, normal turgor, no rashes or lesions noted, normal capillary 

refill. 





 Laboratory Results - last 24 hr











  05/04/17 05/04/17





  07:00 07:00


 


WBC  2.8 L 


 


RBC  4.58 


 


Hgb  14.0 


 


Hct  41.6 


 


MCV  90.9 


 


MCHC  33.6 


 


RDW  13.6 


 


Plt Count  90 L 


 


MPV  9.8 


 


Neutrophils %  Y 


 


Lymphocytes %  Y 


 


Sodium   135 L


 


Potassium   4.0


 


Chloride   104


 


Carbon Dioxide   23


 


Anion Gap   8


 


BUN   17


 


Creatinine   1.0


 


Creat Clearance w eGFR   > 60


 


Random Glucose   103


 


Calcium   8.5


 


Magnesium   2.0


 


Total Bilirubin   0.6  D


 


AST   38  D


 


ALT   26


 


Alkaline Phosphatase   44


 


Total Protein   6.2 L


 


Albumin   3.4 L








Active Medications











Generic Name Dose Route Start Last Admin





  Trade Name Bensonq  PRN Reason Stop Dose Admin


 


Acetaminophen  650 mg 05/02/17 11:33 05/02/17 23:12





  Tylenol -  PO   650 mg





  Q4H PRN   Administration





  FEVER OR PAIN   


 


Acetaminophen  1,000 mg 05/03/17 15:13  





  Ofirmev Injection -  IVPB   





  Q6H PRN   


 


Heparin Sodium (Porcine)  5,000 unit 05/02/17 22:00 05/04/17 09:03





  Heparin -  SQ   5,000 unit





  BID KATERINE   Administration


 


Piperacillin Sod/Tazobactam Sod  50 mls @ 100 mls/hr 05/03/17 10:15 05/04/17 09:

02





  Zosyn 3.375gm Ivpb (Pre-Docked)  IVPB   100 mls/hr





  Q8H-IV KATERINE   Administration





  Protocol   


 


Vancomycin HCl  250 mls @ 200 mls/hr 05/04/17 10:15  





  Vancomycin (Pre-Docked)  IVPB   





  BID KATERINE   


 


Losartan Potassium  100 mg 05/02/17 10:00 05/04/17 09:02





  Cozaar -  PO   100 mg





  DAILY [facility]   Administration


 


Ranitidine HCl  150 mg 05/02/17 12:00 05/04/17 09:02





  Zantac -  PO   150 mg





  DAILY KATER[facility]   Administration











 Microbiology





05/02/17 05:59   Blood - Peripheral Venous   Blood Culture - Preliminary


                            Pending Organism


05/02/17 11:36   Blood - Peripheral Venous   Blood Culture - Preliminary


                            NO GROWTH OBTAINED AFTER 24 HOURS, INCUBATION TO 

CONTINUE


                            FOR 4 DAYS.


05/02/17 11:36   Blood - Peripheral Venous   Blood Culture - Preliminary


                            NO GROWTH OBTAINED AFTER 24 HOURS, INCUBATION TO 

CONTINUE


                            FOR 4 DAYS.


05/02/17 05:59   Blood - Peripheral Venous   Blood Culture - Preliminary


                            NO GROWTH OBTAINED AFTER 24 HOURS, INCUBATION TO 

CONTINUE


                            FOR 4 DAYS.


05/02/17 01:10   Nasopharyngeal Swab   Respiratory Virus Panel - Preliminary


05/01/17 23:10   Urine - Urine Clean Catch   Urine Culture - Final


                            NO GROWTH OBTAINED


05/02/17 01:10   Nasopharyngeal Swab   Influenza Types A,B Antigen (DYANA) - Final

, NEGATIVE 


05/02/17 01:10   Nasopharyngeal Swab    - Final








IMAGING


nuclear stress tests 11/08/2016, LVEF 44%, no chest discomfort or EKG changes, 

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 

perfusion as per cardiologist Dr. Rajput


Echocardiogram, 11/01/2016 EF 68% mild aortic stenosis mild LVH


CT of abdomen and pelvis:no hydronephrosis noted


CT of chest: no acute pathology 


EKG a ventricular paced with PVCs





ASSESSMENT/PLAN:





1) card: 


presyncope:


- likely secondary from to orthostatic hypotension due to hypovolemia 


- Continuous cardiac monitoring 


- troponin 3 WNL


- pacemaker interrogated, no atrial fibrillation noted


- Cardiology consulted and following 


hypertension


- Continue losartan home dose, close monitoring of BP





2) ID


sepsis


- unknown origin, one +  blood culture, preliminary questionable source, vanc 

bid ordered 


- afebrile, no leukocystosis noted


- continue zosyn (5/3- )


- ID consulted and following  (Lenin) 





3) ms: 


gout


- no acute exacerbation at this time





4) heme


thrombocytopenia


- plt 90, likely secondary to viral infectionm 





F/E/N


- Regular diet


- replete lytes prn 





PPX


Zantac


Heparin


PT


OOB


SCD





dispo: requires inpatient telemetry 





Visit type





- Emergency Visit


Emergency Visit: Yes


ED Registration Date: 05/02/17


Care time: The patient presented to the Emergency Department on the above date 

and was hospitalized for further evaluation of their emergent condition.





- New Patient


This patient is new to me today: No





- Critical Care


Critical Care patient: No





- Discharge Referral


Referred to [facility] No

## 2017-05-04 NOTE — PN
Progress Note, Physician


History of Present Illness: 


OOB in chiar


No complaints


No c/o fever/ chills


No cough/ sputum


No dysuria/ hematuria


Worsening leukopenia/ thrombocytopenia


BC + GPCCL x1





- Current Medication List


Current Medications: 


Active Medications





Acetaminophen (Tylenol -)  650 mg PO Q4H PRN


   PRN Reason: FEVER OR PAIN


   Last Admin: 05/02/17 23:12 Dose:  650 mg


Acetaminophen (Ofirmev Injection -)  1,000 mg IVPB Q6H PRN


Heparin Sodium (Porcine) (Heparin -)  5,000 unit SQ BID [facility]


   Last Admin: 05/04/17 09:03 Dose:  5,000 unit


Piperacillin Sod/Tazobactam Sod (Zosyn 3.375gm Ivpb (Pre-Docked))  50 mls @ 100 

mls/hr IVPB Q8H-IV [facility]


   PRN Reason: Protocol


   Last Admin: 05/04/17 09:02 Dose:  100 mls/hr


Losartan Potassium (Cozaar -)  100 mg PO DAILY [facility]


   Last Admin: 05/04/17 09:02 Dose:  100 mg


Ranitidine HCl (Zantac -)  150 mg PO DAILY [facility]


   Last Admin: 05/04/17 09:02 Dose:  150 mg











- Objective


Vital Signs: 


 Vital Signs











Temperature  98.6 F   05/04/17 06:00


 


Pulse Rate  66   05/04/17 06:00


 


Respiratory Rate  19   05/04/17 08:03


 


Blood Pressure  130/55   05/04/17 06:00


 


O2 Sat by Pulse Oximetry (%)  98   05/04/17 08:03











Constitutional: Yes: No Distress


Eyes: Yes: Conjunctiva Clear


Cardiovascular: Yes: Regular Rate and Rhythm, S1, S2


Respiratory: Yes: CTA Bilaterally


Gastrointestinal: Yes: Normal Bowel Sounds, Soft.  No: Tenderness


Edema: No


Labs: 


 CBC, BMP





 05/04/17 07:00 





 05/04/17 07:00 





 INR, PTT











INR  1.14  (0.82-1.09)   05/01/17  22:50    














Assessment/Plan


Fever    Possible viral syndrome


     CT C/A/P unremarkable


+ BC GPCCL ? significance


S/P fall





Pending blood c/s add empiric vancomycin


Continue Zosyn

## 2017-05-05 VITALS — TEMPERATURE: 97.7 F | SYSTOLIC BLOOD PRESSURE: 122 MMHG | DIASTOLIC BLOOD PRESSURE: 35 MMHG | HEART RATE: 55 BPM

## 2017-05-05 LAB
BASOPHILS # BLD: 0.6 % (ref 0–2)
DEPRECATED RDW RBC AUTO: 13.6 % (ref 11.9–15.9)
EOSINOPHIL # BLD: 1.4 % (ref 0–4.5)
MCH RBC QN AUTO: 30.4 PG (ref 25.7–33.7)
MCHC RBC AUTO-ENTMCNC: 33.3 G/DL (ref 32–35.9)
MCV RBC: 91.4 FL (ref 80–96)
NEUTROPHILS # BLD: 40.7 % (ref 42.8–82.8)
PLATELET # BLD AUTO: 100 K/MM3 (ref 134–434)
PMV BLD: 9.4 FL (ref 7.5–11.1)
WBC # BLD AUTO: 2.9 K/MM3 (ref 4–10.8)

## 2017-05-05 RX ADMIN — RANITIDINE SCH MG: 150 TABLET ORAL at 09:13

## 2017-05-05 RX ADMIN — HEPARIN SODIUM SCH UNIT: 5000 INJECTION, SOLUTION INTRAVENOUS; SUBCUTANEOUS at 09:13

## 2017-05-05 RX ADMIN — LOSARTAN POTASSIUM SCH MG: 50 TABLET, FILM COATED ORAL at 09:51

## 2017-05-05 RX ADMIN — PIPERACILLIN SODIUM,TAZOBACTAM SODIUM SCH MLS/HR: 3; .375 INJECTION, POWDER, FOR SOLUTION INTRAVENOUS at 09:13

## 2017-05-05 RX ADMIN — PIPERACILLIN SODIUM,TAZOBACTAM SODIUM SCH MLS/HR: 3; .375 INJECTION, POWDER, FOR SOLUTION INTRAVENOUS at 01:21

## 2017-05-05 RX ADMIN — VANCOMYCIN HYDROCHLORIDE SCH MLS/HR: 1 INJECTION, POWDER, LYOPHILIZED, FOR SOLUTION INTRAVENOUS at 09:50

## 2017-05-05 NOTE — PN
Progress Note, Physician


History of Present Illness: 


Awake, alert


OOB in chair


No focal complaint


Temps down; afebrile


Leukopenic with lymphocytosis. Thrombocytopenic


Blood c/s staph sp- not yet identified





- Current Medication List


Current Medications: 


Active Medications





Acetaminophen (Tylenol -)  650 mg PO Q4H PRN


   PRN Reason: FEVER OR PAIN


   Last Admin: 05/02/17 23:12 Dose:  650 mg


Acetaminophen (Ofirmev Injection -)  1,000 mg IVPB Q6H PRN


Heparin Sodium (Porcine) (Heparin -)  5,000 unit SQ BID [facility]


   Last Admin: 05/05/17 09:13 Dose:  5,000 unit


Piperacillin Sod/Tazobactam Sod (Zosyn 3.375gm Ivpb (Pre-Docked))  50 mls @ 100 

mls/hr IVPB Q8H-IV KATERINE


   PRN Reason: Protocol


   Last Admin: 05/05/17 09:13 Dose:  100 mls/hr


Vancomycin HCl (Vancomycin (Pre-Docked))  250 mls @ 200 mls/hr IVPB BID [facility]


   Last Admin: 05/05/17 09:50 Dose:  200 mls/hr


Losartan Potassium (Cozaar -)  100 mg PO DAILY [facility]


   Last Admin: 05/05/17 09:51 Dose:  100 mg


Ranitidine HCl (Zantac -)  150 mg PO DAILY [facility]


   Last Admin: 05/05/17 09:13 Dose:  150 mg











- Objective


Vital Signs: 


 Vital Signs











Temperature  98.9 F   05/05/17 09:11


 


Pulse Rate  78   05/05/17 09:11


 


Respiratory Rate  17   05/05/17 09:11


 


Blood Pressure  124/51   05/05/17 09:11


 


O2 Sat by Pulse Oximetry (%)  96   05/05/17 06:00











Constitutional: Yes: No Distress


Eyes: Yes: Conjunctiva Clear


Cardiovascular: Yes: Regular Rate and Rhythm, S1, S2


Respiratory: Yes: CTA Bilaterally


Gastrointestinal: Yes: Normal Bowel Sounds, Soft.  No: Tenderness


Labs: 


 CBC, BMP





 05/04/17 07:00 





 05/04/17 07:00 





 INR, PTT











INR  1.14  (0.82-1.09)   05/01/17  22:50    














Assessment/Plan


Fever / leukopenia/ lymphocytosis/ thrombocytopenia -  Probable viral syndrome


     CT C/A/P unremarkable


+ BC GPCCL ? significance   suspect contamination


S/P fall





Repeat CBC


Await final blood c/s


If stable, D/C antibiotics and discharge home later today

## 2017-05-09 NOTE — DS
Physical Exam: 


SUBJECTIVE: Patient seen and examined, patient reports feeling well, sitting in 

bedside chair, denies any chest pain or shortness of breath.  





OBJECTIVE: patient is a 82 y/o male with a past medical history of hypertension

, PPM, renal calculi (left urethral stent placement, removed 3/16), and gout.  

patient's reports that yesterday, 05/01/2017, he stood up from a  sitting 

position and became dizzy and fell forward striking his head on a table. 

Patient denied any loss of consciousness and is able to recall a full incident 

in detail. in addition patient reports feeling increasingly tired with chills 

earlier in the day.  He does report on 04/30/2017,  general malaise and chills.

  as a result, patient contacted EMS and was transferred to the emergency 

department for further evaluation. 





ER course was notable for:


(1) ct of head no acute pathology


(2)tmax of 101.0 upon arrival to the ED 


(3)chest xray,  no infilitrates no effusion noted 





PHYSICAL EXAM





GENERAL: Awake, alert, and fully oriented, in no acute distress.


HEAD: Normal with no signs of trauma.


EYES: Pupils equal, round and reactive to light, extraocular movements intact, 

sclera anicteric, conjunctiva clear. No lid lag.


EARS, NOSE, THROAT: Ears normal, nares patent, oropharynx clear without 

exudates. Moist mucous membranes.


NECK: Normal range of motion, supple without lymphadenopathy, JVD, or masses.


LUNGS: Breath sounds equal, clear to auscultation bilaterally. No wheezes, and 

no crackles. No accessory muscle use.


HEART: Regular rate and rhythm, normal S1 and S2, 2/6 sysolic murmur, rub or 

gallop.


ABDOMEN: Soft, nontender, not distended, normoactive bowel sounds, no guarding, 

no rebound, no masses.  No hepatomegaly or  splenomegaly. 


MUSCULOSKELETAL: Normal range of motion at all joints. No bony deformities or 

tenderness. No CVA tenderness.


UPPER EXTREMITIES: 2+ pulses, warm, well-perfused. No cyanosis. No clubbing. No 

peripheral edema.


LOWER EXTREMITIES: 2+ pulses, warm, well-perfused. No calf tenderness. No 

peripheral edema. 


NEUROLOGICAL:  Cranial nerves II-XII intact. Normal speech. Normal gait.


PSYCHIATRIC: Cooperative. Good eye contact. Appropriate mood and affect.


SKIN: Warm, dry, normal turgor, no rashes or lesions noted, normal capillary 

refill. 





LABS





 CBC











WBC  2.9 K/mm3 (4.0-10.8)  L  05/05/17  10:00    


 


RBC  4.62 M/mm3 (4.00-5.60)   05/05/17  10:00    


 


Hgb  14.1 GM/dl (11.7-16.9)   05/05/17  10:00    


 


Hct  42.2 % (35.4-49)   05/05/17  10:00    


 


MCV  91.4 fl (80-96)   05/05/17  10:00    


 


MCHC  33.3 g/dl (32.0-35.9)   05/05/17  10:00    


 


RDW  13.6 % (11.9-15.9)   05/05/17  10:00    


 


Plt Count  100 K/MM3 (134-434)  L  05/05/17  10:00    


 


MPV  9.4 fl (7.5-11.1)   05/05/17  10:00    


 


Neutrophils %  40.7 % (42.8-82.8)  L  05/05/17  10:00    


 


Lymphocytes %  47.3 % (8-40)  H  05/05/17  10:00    


 


Monocytes %  10.0 % (3.8-10.2)   05/05/17  10:00    


 


Eosinophils %  1.4 % (0-4.5)   05/05/17  10:00    


 


Basophils %  0.6 % (0-2.0)   05/05/17  10:00    








 CMP











Sodium  135 mmol/L (136-145)  L  05/04/17  07:00    


 


Potassium  4.0 mmol/L (3.5-5.1)   05/04/17  07:00    


 


Chloride  104 mmol/L ()   05/04/17  07:00    


 


Carbon Dioxide  23 mmol/L (22-28)   05/04/17  07:00    


 


Anion Gap  8  (8-16)   05/04/17  07:00    


 


BUN  17 mg/dl (7-18)   05/04/17  07:00    


 


Creatinine  1.0 mg/dl (0.6-1.3)   05/04/17  07:00    


 


Creat Clearance w eGFR  > 60  (>60)   05/04/17  07:00    


 


Random Glucose  103 mg/dl ()   05/04/17  07:00    


 


Lactic Acid  1.452 mmol/L (0.4-2.0)   05/01/17  22:50    


 


Calcium  8.5 mg/dl (8.4-10.2)   05/04/17  07:00    


 


Phosphorus  2.8 mg/dl (2.5-4.6)  D 05/03/17  10:12    


 


Magnesium  2.0 mg/dL (1.8-2.4)   05/04/17  07:00    


 


Total Bilirubin  0.6 mg/dl (0.2-1.0)  D 05/04/17  07:00    


 


AST  38 U/L (10-42)  D 05/04/17  07:00    


 


ALT  26 U/L (10-40)   05/04/17  07:00    


 


Alkaline Phosphatase  44 U/L (32-92)   05/04/17  07:00    


 


Creatine Kinase  234 IU/L ()  H D 05/02/17  09:11    


 


CK-MB (CK-2)  2.7 ng/ml (0.3-4.0)   05/02/17  09:11    


 


CK-MB (CK-2) Rel Index  1.2 % (0.0-5.0)   05/02/17  09:11    


 


Troponin I  < 0.03 ng/ml (0.03-0.50)  L  05/02/17  09:11    


 


Total Protein  6.2 g/dl (6.4-8.3)  L  05/04/17  07:00    


 


Albumin  3.4 g/dl (3.5-5.0)  L  05/04/17  07:00    








 Microbiology





05/02/17 01:10   Nasopharyngeal Swab   Respiratory Virus Panel - Preliminary


05/02/17 11:36   Blood - Peripheral Venous   Blood Culture - Final


                            NO GROWTH AFTER 5 DAYS INCUBATION


05/02/17 11:36   Blood - Peripheral Venous   Blood Culture - Final


                            NO GROWTH AFTER 5 DAYS INCUBATION


05/02/17 05:59   Blood - Peripheral Venous   Blood Culture - Final


                            NO GROWTH AFTER 5 DAYS INCUBATION


05/02/17 05:59   Blood - Peripheral Venous   Blood Culture - Final


                            Staphylococcus Epidermidis


05/01/17 23:10   Urine - Urine Clean Catch   Urine Culture - Final


                            NO GROWTH OBTAINED


05/02/17 01:10   Nasopharyngeal Swab   Influenza Types A,B Antigen (DYANA) - Final

, negative 





IMAGING


nuclear stress tests 11/08/2016, LVEF 44%, no chest discomfort or EKG changes, 

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 

perfusion as per cardiologist Dr. Rajput


Echocardiogram, 11/01/2016 EF 68% mild aortic stenosis mild LVH


CT of abdomen and pelvis:no hydronephrosis noted


CT of chest: no acute pathology 


EKG a ventricular paced with PVCs








HOSPITAL COURSE:





   Patient was admitted from the emergency department for presyncopal episode 

and sepsis. patient was placed on continuous cardiac monitoring.  Presyncope 

was secondary  orthostatic hypotension due to hypovolemia.  troponin 3 WNL.  

pacemaker was  interrogated, no atrial fibrillation noted.   Cardiology, Dr Wheeler

, consulted and following.  patient has a past medical history of hypertension


- Continue losartan home dose, close monitoring of BP





2) ID


sepsis


- unknown origin, one +  blood culture, preliminary questionable source, vanc 

bid ordered 


- afebrile, no leukocystosis noted


- continue zosyn (5/3- )


- ID consulted and following  (Lenin) 





3) ms: 


gout


- no acute exacerbation at this time





4) heme


thrombocytopenia


- plt 90, likely secondary to viral infectionm 





F/E/N


- Regular diet


- replete lytes prn 





PPX


Zantac


Heparin


PT


OOB


SCD





dispo: requires inpatient telemetry 





Visit type





Date of Admission:05/02/17





Date of Discharge: 05/09/17











Minutes to complete discharge: 45





Discharge Summary


Reason For Visit: DIZZY AND FELL


Condition: Guarded





- Instructions


Diet, Activity, Other Instructions: 


resume regular low sodium diet


continue all medications as prescribed


please follow up with your primary care physician Dr Hui within 1 week


if chest pain, dizziness, or shortness of breath develops please return to the 

emergency department 


Referrals: 


Carlo Hui MD [Primary Care Provider] - 


Disposition: HOME





- Home Medications


Comprehensive Discharge Medication List: 


Ambulatory Orders





Losartan Potassium [Cozaar] 100 mg PO DAILY 05/01/17 


Ranitidine [Zantac -] 150 mg PO DAILY  tablet 05/05/17 











- Discharge Referral


Referred to [facility] No


Physician Referral: Carlo Hui MD (Int Med)

## 2022-05-26 ENCOUNTER — HOSPITAL ENCOUNTER (INPATIENT)
Dept: HOSPITAL 74 - FER | Age: 87
LOS: 6 days | Discharge: ANOTHER HOSPITAL | DRG: 871 | End: 2022-06-01
Attending: INTERNAL MEDICINE | Admitting: HOSPITALIST
Payer: COMMERCIAL

## 2022-05-26 VITALS — BODY MASS INDEX: 29 KG/M2

## 2022-05-26 DIAGNOSIS — D69.6: ICD-10-CM

## 2022-05-26 DIAGNOSIS — I21.4: ICD-10-CM

## 2022-05-26 DIAGNOSIS — I10: ICD-10-CM

## 2022-05-26 DIAGNOSIS — J96.01: ICD-10-CM

## 2022-05-26 DIAGNOSIS — A41.9: Primary | ICD-10-CM

## 2022-05-26 DIAGNOSIS — I44.2: ICD-10-CM

## 2022-05-26 DIAGNOSIS — E11.9: ICD-10-CM

## 2022-05-26 DIAGNOSIS — J18.9: ICD-10-CM

## 2022-05-26 DIAGNOSIS — I50.21: ICD-10-CM

## 2022-05-26 LAB
ALBUMIN SERPL-MCNC: 4.2 G/DL (ref 3.4–5)
ALP SERPL-CCNC: 67 U/L (ref 45–117)
ALT SERPL-CCNC: 20 U/L (ref 13–61)
ANION GAP SERPL CALC-SCNC: 11 MMOL/L (ref 8–16)
APTT BLD: 28.9 SECONDS (ref 25.2–36.5)
AST SERPL-CCNC: 31 U/L (ref 15–37)
BILIRUB SERPL-MCNC: 1.1 MG/DL (ref 0.2–1)
BUN SERPL-MCNC: 15 MG/DL (ref 7–18)
CALCIUM SERPL-MCNC: 8.9 MG/DL (ref 8.5–10)
CHLORIDE SERPL-SCNC: 101 MMOL/L (ref 98–107)
CO2 SERPL-SCNC: 22 MMOL/L (ref 21–32)
CREAT SERPL-MCNC: 1.1 MG/DL (ref 0.55–1.3)
DEPRECATED RDW RBC AUTO: 15.6 % (ref 11.9–15.9)
EPITH CASTS URNS QL MICRO: (no result) /HPF
GLUCOSE SERPL-MCNC: 110 MG/DL (ref 74–106)
HCT VFR BLD CALC: 43.2 % (ref 35.4–49)
HGB BLD-MCNC: 14.9 G/DL (ref 11.7–16.9)
INR BLD: 1.12 (ref 0.83–1.09)
LACTATE SERPL-MCNC: 2.1 MMOL/L (ref 0.4–2)
LACTATE SERPL-MCNC: 2.4 MMOL/L (ref 0.4–2)
MCH RBC QN AUTO: 30.9 PG (ref 25.7–33.7)
MCHC RBC AUTO-ENTMCNC: 34.4 G/DL (ref 32–35.9)
MCV RBC: 89.8 FL (ref 80–96)
PLATELET # BLD AUTO: 147.7 10^3/UL (ref 134–434)
PMV BLD: 9.3 FL (ref 7.5–11.1)
PROT SERPL-MCNC: 7.4 G/DL (ref 6.4–8.2)
PT PNL PPP: 12.9 SEC (ref 9.7–13)
RBC # BLD AUTO: 4.81 10^6/UL (ref 4–5.6)
SODIUM SERPL-SCNC: 134 MMOL/L (ref 136–145)
WBC # BLD AUTO: 7.1 10^3/UL (ref 4–10.8)

## 2022-05-26 PROCEDURE — U0003 INFECTIOUS AGENT DETECTION BY NUCLEIC ACID (DNA OR RNA); SEVERE ACUTE RESPIRATORY SYNDROME CORONAVIRUS 2 (SARS-COV-2) (CORONAVIRUS DISEASE [COVID-19]), AMPLIFIED PROBE TECHNIQUE, MAKING USE OF HIGH THROUGHPUT TECHNOLOGIES AS DESCRIBED BY CMS-2020-01-R: HCPCS

## 2022-05-26 PROCEDURE — U0005 INFEC AGEN DETEC AMPLI PROBE: HCPCS

## 2022-05-26 RX ADMIN — SODIUM CHLORIDE SCH MLS/HR: 9 INJECTION, SOLUTION INTRAVENOUS at 19:44

## 2022-05-27 LAB
ALBUMIN SERPL-MCNC: 3.3 G/DL (ref 3.4–5)
ALP SERPL-CCNC: 63 U/L (ref 45–117)
ALT SERPL-CCNC: 29 U/L (ref 13–61)
ANION GAP SERPL CALC-SCNC: 10 MMOL/L (ref 8–16)
AST SERPL-CCNC: 89 U/L (ref 15–37)
BASOPHILS # BLD: 0.8 % (ref 0–2)
BILIRUB SERPL-MCNC: 1.1 MG/DL (ref 0.2–1)
BUN SERPL-MCNC: 21 MG/DL (ref 7–18)
CALCIUM SERPL-MCNC: 8.2 MG/DL (ref 8.5–10.1)
CHLORIDE SERPL-SCNC: 103 MMOL/L (ref 98–107)
CHOLEST SERPL-MCNC: 190 MG/DL (ref 50–200)
CO2 SERPL-SCNC: 23 MMOL/L (ref 21–32)
CREAT SERPL-MCNC: 1.3 MG/DL (ref 0.55–1.3)
DEPRECATED RDW RBC AUTO: 15.1 % (ref 11.9–15.9)
EOSINOPHIL # BLD: 0 % (ref 0–4.5)
GLUCOSE SERPL-MCNC: 132 MG/DL (ref 74–106)
HCT VFR BLD CALC: 41.2 % (ref 35.4–49)
HDLC SERPL-MCNC: 38 MG/DL (ref 40–60)
HGB BLD-MCNC: 13.8 GM/DL (ref 11.7–16.9)
LDLC SERPL CALC-MCNC: 133 MG/DL (ref 5–100)
LYMPHOCYTES # BLD: 9 % (ref 8–40)
MAGNESIUM SERPL-MCNC: 2 MG/DL (ref 1.8–2.4)
MCH RBC QN AUTO: 30.1 PG (ref 25.7–33.7)
MCHC RBC AUTO-ENTMCNC: 33.5 G/DL (ref 32–35.9)
MCV RBC: 89.8 FL (ref 80–96)
MONOCYTES # BLD AUTO: 5.4 % (ref 3.8–10.2)
NEUTROPHILS # BLD: 84.8 % (ref 42.8–82.8)
PHOSPHATE SERPL-MCNC: 2.8 MG/DL (ref 2.5–4.9)
PLATELET # BLD AUTO: 133 10^3/UL (ref 134–434)
PMV BLD: 9.5 FL (ref 7.5–11.1)
PROT SERPL-MCNC: 6.4 G/DL (ref 6.4–8.2)
RBC # BLD AUTO: 4.59 M/MM3 (ref 4–5.6)
SODIUM SERPL-SCNC: 136 MMOL/L (ref 136–145)
TRIGL SERPL-MCNC: 97 MG/DL (ref 0–150)
WBC # BLD AUTO: 4.5 K/MM3 (ref 4–10)

## 2022-05-27 RX ADMIN — INSULIN ASPART SCH: 100 INJECTION, SOLUTION INTRAVENOUS; SUBCUTANEOUS at 21:14

## 2022-05-27 RX ADMIN — HEPARIN SODIUM PRN UNIT: 5000 INJECTION, SOLUTION INTRAVENOUS; SUBCUTANEOUS at 04:14

## 2022-05-27 RX ADMIN — INSULIN ASPART SCH: 100 INJECTION, SOLUTION INTRAVENOUS; SUBCUTANEOUS at 10:24

## 2022-05-27 RX ADMIN — PIPERACILLIN SODIUM,TAZOBACTAM SODIUM SCH MLS/HR: 3; .375 INJECTION, POWDER, FOR SOLUTION INTRAVENOUS at 12:09

## 2022-05-27 RX ADMIN — INSULIN ASPART SCH: 100 INJECTION, SOLUTION INTRAVENOUS; SUBCUTANEOUS at 16:15

## 2022-05-27 RX ADMIN — PIPERACILLIN SODIUM,TAZOBACTAM SODIUM SCH MLS/HR: 3; .375 INJECTION, POWDER, FOR SOLUTION INTRAVENOUS at 06:43

## 2022-05-27 RX ADMIN — ATORVASTATIN CALCIUM SCH MG: 80 TABLET, FILM COATED ORAL at 21:14

## 2022-05-27 RX ADMIN — PIPERACILLIN SODIUM,TAZOBACTAM SODIUM SCH MLS/HR: 3; .375 INJECTION, POWDER, FOR SOLUTION INTRAVENOUS at 17:37

## 2022-05-27 RX ADMIN — ASPIRIN SCH MG: 81 TABLET, COATED ORAL at 16:11

## 2022-05-27 RX ADMIN — SODIUM CHLORIDE SCH MLS/HR: 9 INJECTION, SOLUTION INTRAVENOUS at 21:12

## 2022-05-27 RX ADMIN — INSULIN ASPART SCH: 100 INJECTION, SOLUTION INTRAVENOUS; SUBCUTANEOUS at 06:38

## 2022-05-28 LAB
ALBUMIN SERPL-MCNC: 3.4 G/DL (ref 3.4–5)
ALP SERPL-CCNC: 63 U/L (ref 45–117)
ALT SERPL-CCNC: 34 U/L (ref 13–61)
ANION GAP SERPL CALC-SCNC: 8 MMOL/L (ref 8–16)
ANISOCYTOSIS BLD QL: 0
AST SERPL-CCNC: 94 U/L (ref 15–37)
BASO STIPL BLD QL SMEAR: 0
BASOPHILS # BLD: 0.7 % (ref 0–2)
BILIRUB SERPL-MCNC: 1.2 MG/DL (ref 0.2–1)
BUN SERPL-MCNC: 21.8 MG/DL (ref 7–18)
CALCIUM SERPL-MCNC: 8.2 MG/DL (ref 8.5–10.1)
CHLORIDE SERPL-SCNC: 101 MMOL/L (ref 98–107)
CO2 SERPL-SCNC: 26 MMOL/L (ref 21–32)
CREAT SERPL-MCNC: 1.3 MG/DL (ref 0.55–1.3)
DACRYOCYTES BLD QL SMEAR: 0
DEPRECATED RDW RBC AUTO: 15 % (ref 11.9–15.9)
DOHLE BOD BLD QL SMEAR: 0
EOSINOPHIL # BLD: 0.1 % (ref 0–4.5)
GLUCOSE SERPL-MCNC: 102 MG/DL (ref 74–106)
HCT VFR BLD CALC: 41.9 % (ref 35.4–49)
HELMET CELLS BLD QL SMEAR: 0
HGB BLD-MCNC: 14.1 GM/DL (ref 11.7–16.9)
HOWELL-JOLLY BOD BLD QL SMEAR: 0
LYMPHOCYTES # BLD: 17.6 % (ref 8–40)
MACROCYTES BLD QL: 0
MAGNESIUM SERPL-MCNC: 2.2 MG/DL (ref 1.8–2.4)
MCH RBC QN AUTO: 30.6 PG (ref 25.7–33.7)
MCHC RBC AUTO-ENTMCNC: 33.7 G/DL (ref 32–35.9)
MCV RBC: 90.9 FL (ref 80–96)
MONOCYTES # BLD AUTO: 9.7 % (ref 3.8–10.2)
NEUTROPHILS # BLD: 71.9 % (ref 42.8–82.8)
OVALOCYTES BLD QL SMEAR: 0
PHOSPHATE SERPL-MCNC: 2.9 MG/DL (ref 2.5–4.9)
PLATELET # BLD AUTO: 126 10^3/UL (ref 134–434)
PMV BLD: 9.1 FL (ref 7.5–11.1)
PROT SERPL-MCNC: 6.8 G/DL (ref 6.4–8.2)
RBC # BLD AUTO: 4.61 M/MM3 (ref 4–5.6)
ROULEAUX BLD QL SMEAR: 0
SICKLE CELLS BLD QL SMEAR: 0
SODIUM SERPL-SCNC: 135 MMOL/L (ref 136–145)
TARGETS BLD QL SMEAR: 0
TOXIC GRANULES BLD QL SMEAR: 0
WBC # BLD AUTO: 5.4 K/MM3 (ref 4–10)

## 2022-05-28 RX ADMIN — CLOPIDOGREL BISULFATE SCH MG: 75 TABLET, FILM COATED ORAL at 09:56

## 2022-05-28 RX ADMIN — PIPERACILLIN SODIUM,TAZOBACTAM SODIUM SCH MLS/HR: 3; .375 INJECTION, POWDER, FOR SOLUTION INTRAVENOUS at 17:04

## 2022-05-28 RX ADMIN — PIPERACILLIN SODIUM,TAZOBACTAM SODIUM SCH MLS/HR: 3; .375 INJECTION, POWDER, FOR SOLUTION INTRAVENOUS at 09:56

## 2022-05-28 RX ADMIN — INSULIN ASPART SCH: 100 INJECTION, SOLUTION INTRAVENOUS; SUBCUTANEOUS at 06:21

## 2022-05-28 RX ADMIN — INSULIN ASPART SCH: 100 INJECTION, SOLUTION INTRAVENOUS; SUBCUTANEOUS at 11:48

## 2022-05-28 RX ADMIN — INSULIN ASPART SCH: 100 INJECTION, SOLUTION INTRAVENOUS; SUBCUTANEOUS at 21:17

## 2022-05-28 RX ADMIN — INSULIN ASPART SCH: 100 INJECTION, SOLUTION INTRAVENOUS; SUBCUTANEOUS at 16:42

## 2022-05-28 RX ADMIN — PIPERACILLIN SODIUM,TAZOBACTAM SODIUM SCH MLS/HR: 3; .375 INJECTION, POWDER, FOR SOLUTION INTRAVENOUS at 01:00

## 2022-05-28 RX ADMIN — HEPARIN SODIUM PRN UNIT: 5000 INJECTION, SOLUTION INTRAVENOUS; SUBCUTANEOUS at 09:48

## 2022-05-28 RX ADMIN — METOPROLOL TARTRATE SCH MG: 25 TABLET, FILM COATED ORAL at 21:17

## 2022-05-28 RX ADMIN — ATORVASTATIN CALCIUM SCH MG: 80 TABLET, FILM COATED ORAL at 21:16

## 2022-05-28 RX ADMIN — ASPIRIN SCH MG: 81 TABLET, COATED ORAL at 09:56

## 2022-05-29 LAB
DEPRECATED RDW RBC AUTO: 15 % (ref 11.9–15.9)
HCT VFR BLD CALC: 37.7 % (ref 35.4–49)
HGB BLD-MCNC: 12.9 GM/DL (ref 11.7–16.9)
MCH RBC QN AUTO: 31 PG (ref 25.7–33.7)
MCHC RBC AUTO-ENTMCNC: 34.3 G/DL (ref 32–35.9)
MCV RBC: 90.3 FL (ref 80–96)
PLATELET # BLD AUTO: 122 10^3/UL (ref 134–434)
PMV BLD: 8.9 FL (ref 7.5–11.1)
RBC # BLD AUTO: 4.17 M/MM3 (ref 4–5.6)
WBC # BLD AUTO: 3.5 K/MM3 (ref 4–10)

## 2022-05-29 RX ADMIN — INSULIN ASPART SCH: 100 INJECTION, SOLUTION INTRAVENOUS; SUBCUTANEOUS at 07:00

## 2022-05-29 RX ADMIN — ASPIRIN SCH MG: 81 TABLET, COATED ORAL at 09:35

## 2022-05-29 RX ADMIN — METOPROLOL TARTRATE SCH MG: 25 TABLET, FILM COATED ORAL at 21:10

## 2022-05-29 RX ADMIN — INSULIN ASPART SCH: 100 INJECTION, SOLUTION INTRAVENOUS; SUBCUTANEOUS at 11:10

## 2022-05-29 RX ADMIN — ATORVASTATIN CALCIUM SCH MG: 80 TABLET, FILM COATED ORAL at 21:10

## 2022-05-29 RX ADMIN — INSULIN ASPART SCH: 100 INJECTION, SOLUTION INTRAVENOUS; SUBCUTANEOUS at 16:30

## 2022-05-29 RX ADMIN — PIPERACILLIN SODIUM,TAZOBACTAM SODIUM SCH MLS/HR: 3; .375 INJECTION, POWDER, FOR SOLUTION INTRAVENOUS at 09:35

## 2022-05-29 RX ADMIN — METOPROLOL TARTRATE SCH MG: 25 TABLET, FILM COATED ORAL at 09:35

## 2022-05-29 RX ADMIN — PIPERACILLIN SODIUM,TAZOBACTAM SODIUM SCH MLS/HR: 3; .375 INJECTION, POWDER, FOR SOLUTION INTRAVENOUS at 01:26

## 2022-05-29 RX ADMIN — PIPERACILLIN SODIUM,TAZOBACTAM SODIUM SCH MLS/HR: 3; .375 INJECTION, POWDER, FOR SOLUTION INTRAVENOUS at 17:17

## 2022-05-29 RX ADMIN — CLOPIDOGREL BISULFATE SCH MG: 75 TABLET, FILM COATED ORAL at 09:35

## 2022-05-30 LAB
ALBUMIN SERPL-MCNC: 3.3 G/DL (ref 3.4–5)
ALP SERPL-CCNC: 64 U/L (ref 45–117)
ALT SERPL-CCNC: 34 U/L (ref 13–61)
ANION GAP SERPL CALC-SCNC: 6 MMOL/L (ref 8–16)
AST SERPL-CCNC: 54 U/L (ref 15–37)
BASOPHILS # BLD: 0.6 % (ref 0–2)
BILIRUB SERPL-MCNC: 0.7 MG/DL (ref 0.2–1)
BUN SERPL-MCNC: 25.7 MG/DL (ref 7–18)
CALCIUM SERPL-MCNC: 8.6 MG/DL (ref 8.5–10.1)
CHLORIDE SERPL-SCNC: 103 MMOL/L (ref 98–107)
CO2 SERPL-SCNC: 27 MMOL/L (ref 21–32)
CREAT SERPL-MCNC: 1 MG/DL (ref 0.55–1.3)
DEPRECATED RDW RBC AUTO: 15 % (ref 11.9–15.9)
EOSINOPHIL # BLD: 2.3 % (ref 0–4.5)
GLUCOSE SERPL-MCNC: 112 MG/DL (ref 74–106)
HCT VFR BLD CALC: 39.2 % (ref 35.4–49)
HGB BLD-MCNC: 13.3 GM/DL (ref 11.7–16.9)
LYMPHOCYTES # BLD: 28.3 % (ref 8–40)
MCH RBC QN AUTO: 30.6 PG (ref 25.7–33.7)
MCHC RBC AUTO-ENTMCNC: 33.9 G/DL (ref 32–35.9)
MCV RBC: 90.1 FL (ref 80–96)
MONOCYTES # BLD AUTO: 11.1 % (ref 3.8–10.2)
NEUTROPHILS # BLD: 57.7 % (ref 42.8–82.8)
PLATELET # BLD AUTO: 130 10^3/UL (ref 134–434)
PMV BLD: 8.9 FL (ref 7.5–11.1)
PROT SERPL-MCNC: 6.9 G/DL (ref 6.4–8.2)
RBC # BLD AUTO: 4.35 M/MM3 (ref 4–5.6)
SODIUM SERPL-SCNC: 136 MMOL/L (ref 136–145)
WBC # BLD AUTO: 3.6 K/MM3 (ref 4–10)

## 2022-05-30 RX ADMIN — ASPIRIN SCH MG: 81 TABLET, COATED ORAL at 10:33

## 2022-05-30 RX ADMIN — PIPERACILLIN SODIUM,TAZOBACTAM SODIUM SCH MLS/HR: 3; .375 INJECTION, POWDER, FOR SOLUTION INTRAVENOUS at 10:32

## 2022-05-30 RX ADMIN — CLOPIDOGREL BISULFATE SCH MG: 75 TABLET, FILM COATED ORAL at 10:33

## 2022-05-30 RX ADMIN — PIPERACILLIN SODIUM,TAZOBACTAM SODIUM SCH MLS/HR: 3; .375 INJECTION, POWDER, FOR SOLUTION INTRAVENOUS at 17:57

## 2022-05-30 RX ADMIN — ATORVASTATIN CALCIUM SCH MG: 80 TABLET, FILM COATED ORAL at 21:14

## 2022-05-30 RX ADMIN — METOPROLOL TARTRATE SCH MG: 25 TABLET, FILM COATED ORAL at 21:14

## 2022-05-30 RX ADMIN — ENOXAPARIN SODIUM SCH MG: 40 INJECTION SUBCUTANEOUS at 12:47

## 2022-05-30 RX ADMIN — PIPERACILLIN SODIUM,TAZOBACTAM SODIUM SCH MLS/HR: 3; .375 INJECTION, POWDER, FOR SOLUTION INTRAVENOUS at 01:11

## 2022-05-30 RX ADMIN — METOPROLOL TARTRATE SCH MG: 25 TABLET, FILM COATED ORAL at 10:33

## 2022-05-31 VITALS — HEART RATE: 60 BPM

## 2022-05-31 LAB
ALBUMIN SERPL-MCNC: 3.1 G/DL (ref 3.4–5)
ALP SERPL-CCNC: 61 U/L (ref 45–117)
ALT SERPL-CCNC: 34 U/L (ref 13–61)
ANION GAP SERPL CALC-SCNC: 6 MMOL/L (ref 8–16)
AST SERPL-CCNC: 44 U/L (ref 15–37)
BILIRUB SERPL-MCNC: 0.6 MG/DL (ref 0.2–1)
BUN SERPL-MCNC: 18.7 MG/DL (ref 7–18)
CALCIUM SERPL-MCNC: 8.6 MG/DL (ref 8.5–10.1)
CHLORIDE SERPL-SCNC: 107 MMOL/L (ref 98–107)
CO2 SERPL-SCNC: 25 MMOL/L (ref 21–32)
CREAT SERPL-MCNC: 0.8 MG/DL (ref 0.55–1.3)
DEPRECATED RDW RBC AUTO: 15 % (ref 11.9–15.9)
GLUCOSE SERPL-MCNC: 106 MG/DL (ref 74–106)
HCT VFR BLD CALC: 38 % (ref 35.4–49)
HGB BLD-MCNC: 12.9 GM/DL (ref 11.7–16.9)
MCH RBC QN AUTO: 30.4 PG (ref 25.7–33.7)
MCHC RBC AUTO-ENTMCNC: 34 G/DL (ref 32–35.9)
MCV RBC: 89.4 FL (ref 80–96)
PLATELET # BLD AUTO: 132 10^3/UL (ref 134–434)
PMV BLD: 9 FL (ref 7.5–11.1)
PROT SERPL-MCNC: 6.6 G/DL (ref 6.4–8.2)
RBC # BLD AUTO: 4.26 M/MM3 (ref 4–5.6)
SODIUM SERPL-SCNC: 138 MMOL/L (ref 136–145)
WBC # BLD AUTO: 3.6 K/MM3 (ref 4–10)

## 2022-05-31 RX ADMIN — ASPIRIN SCH MG: 81 TABLET, COATED ORAL at 09:07

## 2022-05-31 RX ADMIN — PIPERACILLIN SODIUM,TAZOBACTAM SODIUM SCH MLS/HR: 3; .375 INJECTION, POWDER, FOR SOLUTION INTRAVENOUS at 01:21

## 2022-05-31 RX ADMIN — METOPROLOL TARTRATE SCH MG: 25 TABLET, FILM COATED ORAL at 21:25

## 2022-05-31 RX ADMIN — METOPROLOL TARTRATE SCH MG: 25 TABLET, FILM COATED ORAL at 09:07

## 2022-05-31 RX ADMIN — PIPERACILLIN SODIUM,TAZOBACTAM SODIUM SCH MLS/HR: 3; .375 INJECTION, POWDER, FOR SOLUTION INTRAVENOUS at 09:08

## 2022-05-31 RX ADMIN — ATORVASTATIN CALCIUM SCH MG: 80 TABLET, FILM COATED ORAL at 21:25

## 2022-05-31 RX ADMIN — CLOPIDOGREL BISULFATE SCH MG: 75 TABLET, FILM COATED ORAL at 09:07

## 2022-05-31 RX ADMIN — ENOXAPARIN SODIUM SCH MG: 40 INJECTION SUBCUTANEOUS at 09:07

## 2022-06-01 VITALS — TEMPERATURE: 98.2 F | DIASTOLIC BLOOD PRESSURE: 70 MMHG | SYSTOLIC BLOOD PRESSURE: 147 MMHG

## 2022-06-01 LAB
DEPRECATED RDW RBC AUTO: 14.7 % (ref 11.9–15.9)
HCT VFR BLD CALC: 40.5 % (ref 35.4–49)
HGB BLD-MCNC: 13.7 GM/DL (ref 11.7–16.9)
MCH RBC QN AUTO: 30.3 PG (ref 25.7–33.7)
MCHC RBC AUTO-ENTMCNC: 33.8 G/DL (ref 32–35.9)
MCV RBC: 89.7 FL (ref 80–96)
PLATELET # BLD AUTO: 161 10^3/UL (ref 134–434)
PMV BLD: 9 FL (ref 7.5–11.1)
RBC # BLD AUTO: 4.51 M/MM3 (ref 4–5.6)
WBC # BLD AUTO: 4.2 K/MM3 (ref 4–10)

## 2023-05-24 ENCOUNTER — HOSPITAL ENCOUNTER (OUTPATIENT)
Dept: HOSPITAL 74 - JER | Age: 88
Setting detail: OBSERVATION
LOS: 1 days | Discharge: HOME | End: 2023-05-25
Attending: NURSE PRACTITIONER | Admitting: STUDENT IN AN ORGANIZED HEALTH CARE EDUCATION/TRAINING PROGRAM
Payer: COMMERCIAL

## 2023-05-24 VITALS — BODY MASS INDEX: 30.7 KG/M2

## 2023-05-24 VITALS — RESPIRATION RATE: 18 BRPM

## 2023-05-24 DIAGNOSIS — I11.0: ICD-10-CM

## 2023-05-24 DIAGNOSIS — T18.128A: Primary | ICD-10-CM

## 2023-05-24 DIAGNOSIS — Z96.653: ICD-10-CM

## 2023-05-24 DIAGNOSIS — I50.9: ICD-10-CM

## 2023-05-24 DIAGNOSIS — Z96.643: ICD-10-CM

## 2023-05-24 DIAGNOSIS — R04.1: ICD-10-CM

## 2023-05-24 DIAGNOSIS — Z95.0: ICD-10-CM

## 2023-05-24 DIAGNOSIS — I25.2: ICD-10-CM

## 2023-05-24 DIAGNOSIS — I35.0: ICD-10-CM

## 2023-05-24 DIAGNOSIS — Z87.891: ICD-10-CM

## 2023-05-24 DIAGNOSIS — I25.10: ICD-10-CM
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
## 2023-05-24 LAB
ALBUMIN SERPL-MCNC: 3.9 G/DL (ref 3.4–5)
ALP SERPL-CCNC: 76 U/L (ref 45–117)
ALT SERPL-CCNC: 41 U/L (ref 13–61)
ANION GAP SERPL CALC-SCNC: 5 MMOL/L (ref 8–16)
AST SERPL-CCNC: 38 U/L (ref 15–37)
BASOPHILS # BLD: 0.9 % (ref 0–2)
BILIRUB SERPL-MCNC: 1 MG/DL (ref 0.2–1)
BUN SERPL-MCNC: 19.9 MG/DL (ref 7–18)
CALCIUM SERPL-MCNC: 9.2 MG/DL (ref 8.5–10.1)
CHLORIDE SERPL-SCNC: 108 MMOL/L (ref 98–107)
CO2 SERPL-SCNC: 28 MMOL/L (ref 21–32)
CREAT SERPL-MCNC: 1 MG/DL (ref 0.55–1.3)
DEPRECATED RDW RBC AUTO: 14.9 % (ref 11.9–15.9)
EOSINOPHIL # BLD: 1.9 % (ref 0–4.5)
GLUCOSE SERPL-MCNC: 139 MG/DL (ref 74–106)
HCT VFR BLD CALC: 41.8 % (ref 35.4–49)
HGB BLD-MCNC: 14 GM/DL (ref 11.7–16.9)
LYMPHOCYTES # BLD: 23 % (ref 8–40)
MCH RBC QN AUTO: 30.3 PG (ref 25.7–33.7)
MCHC RBC AUTO-ENTMCNC: 33.5 G/DL (ref 32–35.9)
MCV RBC: 90.2 FL (ref 80–96)
MONOCYTES # BLD AUTO: 7.2 % (ref 3.8–10.2)
NEUTROPHILS # BLD: 67 % (ref 42.8–82.8)
PLATELET # BLD AUTO: 138 10^3/UL (ref 134–434)
PMV BLD: 8.5 FL (ref 7.5–11.1)
POTASSIUM SERPLBLD-SCNC: 4 MMOL/L (ref 3.5–5.1)
PROT SERPL-MCNC: 7.3 G/DL (ref 6.4–8.2)
RBC # BLD AUTO: 4.63 M/MM3 (ref 4–5.6)
SODIUM SERPL-SCNC: 141 MMOL/L (ref 136–145)
WBC # BLD AUTO: 5.8 K/MM3 (ref 4–10)

## 2023-05-24 PROCEDURE — G0378 HOSPITAL OBSERVATION PER HR: HCPCS

## 2023-05-24 PROCEDURE — 3E0337Z INTRODUCTION OF ELECTROLYTIC AND WATER BALANCE SUBSTANCE INTO PERIPHERAL VEIN, PERCUTANEOUS APPROACH: ICD-10-PCS | Performed by: INTERNAL MEDICINE

## 2023-05-24 PROCEDURE — 0DC48ZZ EXTIRPATION OF MATTER FROM ESOPHAGOGASTRIC JUNCTION, VIA NATURAL OR ARTIFICIAL OPENING ENDOSCOPIC: ICD-10-PCS | Performed by: NURSE PRACTITIONER

## 2023-05-24 PROCEDURE — U0003 INFECTIOUS AGENT DETECTION BY NUCLEIC ACID (DNA OR RNA); SEVERE ACUTE RESPIRATORY SYNDROME CORONAVIRUS 2 (SARS-COV-2) (CORONAVIRUS DISEASE [COVID-19]), AMPLIFIED PROBE TECHNIQUE, MAKING USE OF HIGH THROUGHPUT TECHNOLOGIES AS DESCRIBED BY CMS-2020-01-R: HCPCS

## 2023-05-24 PROCEDURE — U0005 INFEC AGEN DETEC AMPLI PROBE: HCPCS

## 2023-05-25 VITALS — TEMPERATURE: 97.9 F | DIASTOLIC BLOOD PRESSURE: 59 MMHG | SYSTOLIC BLOOD PRESSURE: 124 MMHG | HEART RATE: 69 BPM

## 2023-05-25 LAB
ANION GAP SERPL CALC-SCNC: 10 MMOL/L (ref 8–16)
BASOPHILS # BLD: 0.4 % (ref 0–2)
BUN SERPL-MCNC: 22.1 MG/DL (ref 7–18)
CALCIUM SERPL-MCNC: 9.1 MG/DL (ref 8.5–10.1)
CHLORIDE SERPL-SCNC: 105 MMOL/L (ref 98–107)
CO2 SERPL-SCNC: 24 MMOL/L (ref 21–32)
CREAT SERPL-MCNC: 1.1 MG/DL (ref 0.55–1.3)
DEPRECATED RDW RBC AUTO: 14.7 % (ref 11.9–15.9)
EOSINOPHIL # BLD: 0 % (ref 0–4.5)
GLUCOSE SERPL-MCNC: 144 MG/DL (ref 74–106)
HCT VFR BLD CALC: 41.5 % (ref 35.4–49)
HGB BLD-MCNC: 14.2 GM/DL (ref 11.7–16.9)
LYMPHOCYTES # BLD: 12.7 % (ref 8–40)
MCH RBC QN AUTO: 31 PG (ref 25.7–33.7)
MCHC RBC AUTO-ENTMCNC: 34.3 G/DL (ref 32–35.9)
MCV RBC: 90.4 FL (ref 80–96)
MONOCYTES # BLD AUTO: 1.9 % (ref 3.8–10.2)
NEUTROPHILS # BLD: 85 % (ref 42.8–82.8)
PLATELET # BLD AUTO: 128 10^3/UL (ref 134–434)
PMV BLD: 9 FL (ref 7.5–11.1)
POTASSIUM SERPLBLD-SCNC: 4.3 MMOL/L (ref 3.5–5.1)
RBC # BLD AUTO: 4.59 M/MM3 (ref 4–5.6)
SODIUM SERPL-SCNC: 139 MMOL/L (ref 136–145)
WBC # BLD AUTO: 9.7 K/MM3 (ref 4–10)

## 2023-09-10 ENCOUNTER — HOSPITAL ENCOUNTER (EMERGENCY)
Dept: HOSPITAL 74 - FER | Age: 88
Discharge: HOME | End: 2023-09-10
Payer: COMMERCIAL

## 2023-09-10 VITALS
DIASTOLIC BLOOD PRESSURE: 66 MMHG | RESPIRATION RATE: 19 BRPM | SYSTOLIC BLOOD PRESSURE: 144 MMHG | HEART RATE: 66 BPM | TEMPERATURE: 98.2 F

## 2023-09-10 VITALS — BODY MASS INDEX: 30 KG/M2

## 2023-09-10 DIAGNOSIS — R22.42: ICD-10-CM

## 2023-09-10 DIAGNOSIS — L02.31: Primary | ICD-10-CM

## 2023-09-10 DIAGNOSIS — M79.652: ICD-10-CM

## 2023-09-10 PROCEDURE — 0H98XZZ DRAINAGE OF BUTTOCK SKIN, EXTERNAL APPROACH: ICD-10-PCS
